# Patient Record
Sex: MALE | Race: WHITE | NOT HISPANIC OR LATINO
[De-identification: names, ages, dates, MRNs, and addresses within clinical notes are randomized per-mention and may not be internally consistent; named-entity substitution may affect disease eponyms.]

---

## 2020-10-27 ENCOUNTER — RESULT CHARGE (OUTPATIENT)
Age: 50
End: 2020-10-27

## 2020-10-27 ENCOUNTER — APPOINTMENT (OUTPATIENT)
Dept: ENDOCRINOLOGY | Facility: CLINIC | Age: 50
End: 2020-10-27
Payer: COMMERCIAL

## 2020-10-27 VITALS
SYSTOLIC BLOOD PRESSURE: 164 MMHG | HEART RATE: 78 BPM | DIASTOLIC BLOOD PRESSURE: 78 MMHG | WEIGHT: 315 LBS | OXYGEN SATURATION: 98 %

## 2020-10-27 DIAGNOSIS — F41.9 ANXIETY DISORDER, UNSPECIFIED: ICD-10-CM

## 2020-10-27 PROCEDURE — 99072 ADDL SUPL MATRL&STAF TM PHE: CPT

## 2020-10-27 PROCEDURE — 99205 OFFICE O/P NEW HI 60 MIN: CPT | Mod: 25

## 2020-10-27 PROCEDURE — 83036 HEMOGLOBIN GLYCOSYLATED A1C: CPT | Mod: QW

## 2020-10-27 PROCEDURE — 82962 GLUCOSE BLOOD TEST: CPT

## 2020-10-28 LAB
CREAT SPEC-SCNC: 99 MG/DL
MICROALBUMIN 24H UR DL<=1MG/L-MCNC: 3.2 MG/DL
MICROALBUMIN/CREAT 24H UR-RTO: 32 MG/G

## 2020-10-28 NOTE — CONSULT LETTER
[Dear  ___] : Dear  [unfilled], [Consult Letter:] : I had the pleasure of evaluating your patient, [unfilled]. [Please see my note below.] : Please see my note below. [Consult Closing:] : Thank you very much for allowing me to participate in the care of this patient.  If you have any questions, please do not hesitate to contact me. [Sincerely,] : Sincerely, [FreeTextEntry3] : Anneliese Evans MS. DO.\par Endocrinology, Diabetes and Metabolism\par [address] [place] [address]\[place] [address]\par Tel [phone]\par Fax [phone]\par

## 2020-10-28 NOTE — ASSESSMENT
[FreeTextEntry1] : 50 year old male with past medical history of Diabetes Mellitus Type 2, HLD, HTN, Obesity, hypogonadism, anxiety  who presents for management of his diabetes\par \par 1. DM 2- uncontrolled, uncomplicated\par Patient counseled on the importance of diabetic control and complications. Patient's diet and the necessary changes discussed. Reviewed over freestyle daniel and use. Reviewed over glycemic goals. Discussed other options for diabetes control. Suggested to try GLP-1. Reviewed mechanism of action, risks and benefits. Patient agrees to try. \par \par Training provided for GLP-1 injection.\par Inc Metformin to 1000 mg BID\par Start Ozempic\par Check fsg BID\par Cont diabetic diet\par Cont exercise\par will check hga1c, microalbumin/cre, lipid, bmp, vit d\par Opthalmology Visit up to date\par Foot Exam up to date\par \par 2. HLD- elevated\par cont atorvastatin\par \par 3. Hypogonadism\par Will send for repeat and workup\par \par 4. Obesity\par Discussed medical management and surgical management\par Will start with medical and try to manage his weight loss for at least 6+ months\par \par Follow up in 6 weeks\par \par

## 2020-10-28 NOTE — HISTORY OF PRESENT ILLNESS
[FreeTextEntry1] : Mr. ISIS COLE  is a 50 year old male with past medical history of Diabetes Mellitus Type 2, HLD, HTN, Obesity, hypogonadism, anxiety  who presents for management of his diabetes. He  has had difficulty in controlling his  blood sugars. Patient denies any history of diabetic retinopathy, nephropathy or neuropathy. He denies any blurry vision and numbness/tingling in the extremities but has been having polyuria\par \par \par POCT Glucose: 275 mg/dL\par POCT Hga1c: %\par \par \par Diabetes first diagnosed: 2019\par Type: 2\par \par Current diabetic regimen:\par Metformin 500 mg BID\par \par Other relevant medications:\par \par Self monitoring blood glucose : Patient does check blood sugars\par \par SMBG:\par Pre-breakfast:\par Pre-lunch:\par Pre-dinner:\par bedtime:\par \par Hypoglycemia:\par \par \par Diet:\par Breakfast: eggs, banana, bagels, english muffin\par Lunch:sandwich, leftoves\par Dinner:pizza, fast food, chinese food, pork chops\par Snacks: fruit, cheese, granola bars\par \par Exercise:\par \par Urine micro:NA\par lipid profile:  (9/2020)\par last hba1c: 9% (10/2020)\par eye exam: few years\par diabetic foot exam/podiatry:NA\par \par \par \par \par

## 2020-12-09 ENCOUNTER — RESULT CHARGE (OUTPATIENT)
Age: 50
End: 2020-12-09

## 2020-12-09 ENCOUNTER — APPOINTMENT (OUTPATIENT)
Dept: ENDOCRINOLOGY | Facility: CLINIC | Age: 50
End: 2020-12-09
Payer: COMMERCIAL

## 2020-12-09 VITALS
WEIGHT: 307 LBS | OXYGEN SATURATION: 97 % | HEIGHT: 68 IN | SYSTOLIC BLOOD PRESSURE: 124 MMHG | BODY MASS INDEX: 46.53 KG/M2 | DIASTOLIC BLOOD PRESSURE: 86 MMHG | HEART RATE: 78 BPM

## 2020-12-09 DIAGNOSIS — E29.1 TESTICULAR HYPOFUNCTION: ICD-10-CM

## 2020-12-09 DIAGNOSIS — E66.01 MORBID (SEVERE) OBESITY DUE TO EXCESS CALORIES: ICD-10-CM

## 2020-12-09 PROCEDURE — 99072 ADDL SUPL MATRL&STAF TM PHE: CPT

## 2020-12-09 PROCEDURE — 95251 CONT GLUC MNTR ANALYSIS I&R: CPT

## 2020-12-09 PROCEDURE — 99214 OFFICE O/P EST MOD 30 MIN: CPT | Mod: 25

## 2020-12-09 PROCEDURE — 82962 GLUCOSE BLOOD TEST: CPT

## 2020-12-09 NOTE — HISTORY OF PRESENT ILLNESS
[FreeTextEntry1] : Mr. ISIS COLE  is a 50 year old male with past medical history of Diabetes Mellitus Type 2, HLD, HTN, Obesity, hypogonadism, anxiety who presents for management of his diabetes.  Patient has lost 10-12 lbs\par \par \par POCT Glucose: 158 mg/dL\par POCT Hga1c: %\par \par \par Diabetes first diagnosed: 2019\par Type: 2\par \par Current diabetic regimen:\par Metformin 1000 mg BID\par Ozempic 0.5 mg Qweekly\par Other relevant medications:\par \par Self monitoring blood glucose : Patient using freestyle daniel\par \par AGP Report: \par (10/11/20-12/9/20)\par High: 5%\par Target (): 93%\par Low: 1%\par \par \par Hypoglycemia:\par \par \par Diet:\par Breakfast: eggs, banana, bagels, english muffin\par Lunch:sandwich, leftoves\par Dinner:pizza, fast food, chinese food, pork chops\par Snacks: fruit, cheese, granola bars\par \par Exercise:\par \par Urine micro: 32 (10/2020)\par lipid profile:  (9/2020)\par last hba1c: 9% (10/2020)\par eye exam: few years\par diabetic foot exam/podiatry:in office (10/2020)\par \par \par \par \par

## 2020-12-09 NOTE — ASSESSMENT
[FreeTextEntry1] : 50 year old male with past medical history of Diabetes Mellitus Type 2, HLD, HTN, Obesity, hypogonadism, anxiety  who presents for management of his diabetes\par \par 1. DM 2- uncontrolled, uncomplicated\par Patient counseled on the importance of diabetic control and complications. Patient's diet and the necessary changes discussed. Reviewed over freestyle daniel and use. Reviewed over glycemic goals. Discussed other options for diabetes control. \par \par Cont Metformin 1000 mg BID\par Cont Ozempic\par Check fsg BID\par Cont diabetic diet\par Cont exercise\par labs UTD\par Opthalmology Visit up to date\par Foot Exam up to date\par \par 2. HLD- elevated\par cont atorvastatin\par \par 3. Hypogonadism\par Will send for repeat and workup\par \par 4. Obesity\par Discussed medical management and surgical management\par Will start with medical and try to manage his weight loss for at least 6+ months\par \par Follow up in 3 months\par \par

## 2020-12-17 ENCOUNTER — NON-APPOINTMENT (OUTPATIENT)
Age: 50
End: 2020-12-17

## 2021-01-22 RX ORDER — FLASH GLUCOSE SENSOR
KIT MISCELLANEOUS
Qty: 6 | Refills: 3 | Status: ACTIVE | COMMUNITY
Start: 2021-01-22 | End: 1900-01-01

## 2021-02-04 ENCOUNTER — APPOINTMENT (OUTPATIENT)
Dept: UROLOGY | Facility: CLINIC | Age: 51
End: 2021-02-04

## 2021-02-24 ENCOUNTER — APPOINTMENT (OUTPATIENT)
Dept: GASTROENTEROLOGY | Facility: CLINIC | Age: 51
End: 2021-02-24
Payer: COMMERCIAL

## 2021-02-24 VITALS
HEIGHT: 68 IN | WEIGHT: 290 LBS | BODY MASS INDEX: 43.95 KG/M2 | SYSTOLIC BLOOD PRESSURE: 157 MMHG | OXYGEN SATURATION: 94 % | HEART RATE: 73 BPM | DIASTOLIC BLOOD PRESSURE: 91 MMHG

## 2021-02-24 DIAGNOSIS — Z12.11 ENCOUNTER FOR SCREENING FOR MALIGNANT NEOPLASM OF COLON: ICD-10-CM

## 2021-02-24 DIAGNOSIS — Z78.9 OTHER SPECIFIED HEALTH STATUS: ICD-10-CM

## 2021-02-24 DIAGNOSIS — K57.92 DIVERTICULITIS OF INTESTINE, PART UNSPECIFIED, W/OUT PERFORATION OR ABSCESS W/OUT BLEEDING: ICD-10-CM

## 2021-02-24 DIAGNOSIS — I10 ESSENTIAL (PRIMARY) HYPERTENSION: ICD-10-CM

## 2021-02-24 PROCEDURE — 99072 ADDL SUPL MATRL&STAF TM PHE: CPT

## 2021-02-24 PROCEDURE — 99202 OFFICE O/P NEW SF 15 MIN: CPT

## 2021-02-24 RX ORDER — SODIUM SULFATE, POTASSIUM SULFATE, MAGNESIUM SULFATE 17.5; 3.13; 1.6 G/ML; G/ML; G/ML
17.5-3.13-1.6 SOLUTION, CONCENTRATE ORAL
Qty: 1 | Refills: 0 | Status: ACTIVE | COMMUNITY
Start: 2021-02-24 | End: 1900-01-01

## 2021-02-24 RX ORDER — TRIAMTERENE AND HYDROCHLOROTHIAZIDE 37.5; 25 MG/1; MG/1
CAPSULE ORAL
Refills: 0 | Status: ACTIVE | COMMUNITY

## 2021-02-24 RX ORDER — ASPIRIN 325 MG/1
325 TABLET, FILM COATED ORAL
Refills: 0 | Status: ACTIVE | COMMUNITY

## 2021-02-24 RX ORDER — ATORVASTATIN CALCIUM 20 MG/1
20 TABLET, FILM COATED ORAL
Refills: 0 | Status: ACTIVE | COMMUNITY

## 2021-02-24 NOTE — ASSESSMENT
[FreeTextEntry1] : Impression\par \par Morbidly obese gentleman with a BMI of 46 to have colonoscopy in a hospital setting\par \par Presurgical testing\par \par Anesthesiologist can decide on anesthesia to be given\par \par Request for colon cancer screening\par \par Suggest\par \par Agree with colonoscopy\par \par Nulytely\par \par The laxative, or its risks benefits and alternatives have been thoroughly reviewed with the patient in great detail. The laxative instructions have been reviewed in great detail with the patient.\par \par Risks/benefits:\par The procedure, the risks and benefits and alternatives have been reviewed in great detail with the patient.  Risks including, but not limited to sedation such as cardiac and pulmonary compromise, the procedure itself such as bleeding requiring hospitalization, transfusion, surgery, temporary or permanent colostomy.  Perforation or puncture of the requiring hospitalization, surgery, temporary colostomy.\par It has been explained to the patient that though colonoscopy is thought to be the best screening exam for colon cancer and polyps, no screening exam can find all colon polyps or cancers.  \par The patient expresses understanding of the procedure and consents to undergoing the procedure.\par

## 2021-02-24 NOTE — REASON FOR VISIT
[Initial Evaluation] : an initial evaluation [Spouse] : spouse [FreeTextEntry1] : Referral for colon cancer screening

## 2021-02-24 NOTE — HISTORY OF PRESENT ILLNESS
[de-identified] : Feb 24, 2021 \par \par MAI MORALES, takes care of this very pleasant 50-year-old gentleman\par \par Morbidly obese\par \par Here to schedule his first colonoscopy\par \par Mr. ISIS COLE 50 year is referred for colon cancer screening.  The patient denies any change in bowel movements, blood per rectum, abdominal, pelvic or rectal discomfort.   \par \par There is no family history of colon cancer or other gastrointestinal cancers.\par \par The patient denies any unexplained weight loss, fever chills or night sweats.\par \par This is the first screening colonoscopy for the patient.\par \par There is no significant cardiac or pulmonary history.\par \par No complaints of chest pain, shortness of breath, palpitations, cough.\par \par The patient is feeling quite well.\par \par The patient is on no significant anticoagulant therapy or anti platelet therapy. \par \par No adverse reaction to anesthesia in the past.\par \par

## 2021-02-24 NOTE — PHYSICAL EXAM
[General Appearance - Alert] : alert [General Appearance - In No Acute Distress] : in no acute distress [FreeTextEntry1] : Very pleasant morbidly obese 50-year-old gentleman with a BMI of 46 [Sclera] : the sclera and conjunctiva were normal [Neck Appearance] : the appearance of the neck was normal [Neck Cervical Mass (___cm)] : no neck mass was observed [Jugular Venous Distention Increased] : there was no jugular-venous distention [Thyroid Diffuse Enlargement] : the thyroid was not enlarged [Thyroid Nodule] : there were no palpable thyroid nodules [Apical Impulse] : the apical impulse was normal [Full Pulse] : the pedal pulses are present [Edema] : there was no peripheral edema [Bowel Sounds] : normal bowel sounds [Abdomen Soft] : soft [Abdomen Tenderness] : non-tender [Abdomen Mass (___ Cm)] : no abdominal mass palpated [] : no hepato-splenomegaly [Cervical Lymph Nodes Enlarged Posterior Bilaterally] : posterior cervical [Cervical Lymph Nodes Enlarged Anterior Bilaterally] : anterior cervical [Supraclavicular Lymph Nodes Enlarged Bilaterally] : supraclavicular [Axillary Lymph Nodes Enlarged Bilaterally] : axillary [Femoral Lymph Nodes Enlarged Bilaterally] : femoral [Inguinal Lymph Nodes Enlarged Bilaterally] : inguinal [No CVA Tenderness] : no ~M costovertebral angle tenderness [No Spinal Tenderness] : no spinal tenderness [Abnormal Walk] : normal gait [Nail Clubbing] : no clubbing  or cyanosis of the fingernails [Musculoskeletal - Swelling] : no joint swelling seen [Motor Tone] : muscle strength and tone were normal [No Focal Deficits] : no focal deficits [Oriented To Time, Place, And Person] : oriented to person, place, and time [Impaired Insight] : insight and judgment were intact [Affect] : the affect was normal

## 2021-02-24 NOTE — CONSULT LETTER
[Dear  ___] : Dear  [unfilled], [Consult Letter:] : I had the pleasure of evaluating your patient, [unfilled]. [Please see my note below.] : Please see my note below. [Consult Closing:] : Thank you very much for allowing me to participate in the care of this patient.  If you have any questions, please do not hesitate to contact me. [Sincerely,] : Sincerely, [FreeTextEntry2] : Elías Berkowitz MD\par  [FreeTextEntry3] : Clemente Greenberg MD\par

## 2021-03-18 ENCOUNTER — APPOINTMENT (OUTPATIENT)
Dept: UROLOGY | Facility: CLINIC | Age: 51
End: 2021-03-18

## 2021-04-15 ENCOUNTER — OUTPATIENT (OUTPATIENT)
Dept: OUTPATIENT SERVICES | Facility: HOSPITAL | Age: 51
LOS: 1 days | End: 2021-04-15
Payer: COMMERCIAL

## 2021-04-15 VITALS
OXYGEN SATURATION: 98 % | TEMPERATURE: 98 F | RESPIRATION RATE: 14 BRPM | WEIGHT: 289.91 LBS | SYSTOLIC BLOOD PRESSURE: 155 MMHG | DIASTOLIC BLOOD PRESSURE: 80 MMHG | HEART RATE: 72 BPM | HEIGHT: 68 IN

## 2021-04-15 DIAGNOSIS — Z12.11 ENCOUNTER FOR SCREENING FOR MALIGNANT NEOPLASM OF COLON: ICD-10-CM

## 2021-04-15 DIAGNOSIS — E11.9 TYPE 2 DIABETES MELLITUS WITHOUT COMPLICATIONS: ICD-10-CM

## 2021-04-15 DIAGNOSIS — Z98.890 OTHER SPECIFIED POSTPROCEDURAL STATES: Chronic | ICD-10-CM

## 2021-04-15 DIAGNOSIS — Z01.818 ENCOUNTER FOR OTHER PREPROCEDURAL EXAMINATION: ICD-10-CM

## 2021-04-15 DIAGNOSIS — K08.409 PARTIAL LOSS OF TEETH, UNSPECIFIED CAUSE, UNSPECIFIED CLASS: Chronic | ICD-10-CM

## 2021-04-15 DIAGNOSIS — Z12.39 ENCOUNTER FOR OTHER SCREENING FOR MALIGNANT NEOPLASM OF BREAST: ICD-10-CM

## 2021-04-15 DIAGNOSIS — Z90.49 ACQUIRED ABSENCE OF OTHER SPECIFIED PARTS OF DIGESTIVE TRACT: Chronic | ICD-10-CM

## 2021-04-15 LAB
A1C WITH ESTIMATED AVERAGE GLUCOSE RESULT: 6.2 % — HIGH (ref 4–5.6)
ALBUMIN SERPL ELPH-MCNC: 4 G/DL — SIGNIFICANT CHANGE UP (ref 3.3–5)
ALP SERPL-CCNC: 79 U/L — SIGNIFICANT CHANGE UP (ref 40–120)
ALT FLD-CCNC: 37 U/L — SIGNIFICANT CHANGE UP (ref 12–78)
ANION GAP SERPL CALC-SCNC: 6 MMOL/L — SIGNIFICANT CHANGE UP (ref 5–17)
AST SERPL-CCNC: 20 U/L — SIGNIFICANT CHANGE UP (ref 15–37)
BILIRUB SERPL-MCNC: 0.4 MG/DL — SIGNIFICANT CHANGE UP (ref 0.2–1.2)
BUN SERPL-MCNC: 25 MG/DL — HIGH (ref 7–23)
CALCIUM SERPL-MCNC: 9.4 MG/DL — SIGNIFICANT CHANGE UP (ref 8.5–10.1)
CHLORIDE SERPL-SCNC: 103 MMOL/L — SIGNIFICANT CHANGE UP (ref 96–108)
CO2 SERPL-SCNC: 29 MMOL/L — SIGNIFICANT CHANGE UP (ref 22–31)
CREAT SERPL-MCNC: 0.96 MG/DL — SIGNIFICANT CHANGE UP (ref 0.5–1.3)
ESTIMATED AVERAGE GLUCOSE: 131 MG/DL — HIGH (ref 68–114)
GLUCOSE SERPL-MCNC: 101 MG/DL — HIGH (ref 70–99)
HCT VFR BLD CALC: 47 % — SIGNIFICANT CHANGE UP (ref 39–50)
HGB BLD-MCNC: 15.3 G/DL — SIGNIFICANT CHANGE UP (ref 13–17)
MCHC RBC-ENTMCNC: 25.8 PG — LOW (ref 27–34)
MCHC RBC-ENTMCNC: 32.6 GM/DL — SIGNIFICANT CHANGE UP (ref 32–36)
MCV RBC AUTO: 79.4 FL — LOW (ref 80–100)
NRBC # BLD: 0 /100 WBCS — SIGNIFICANT CHANGE UP (ref 0–0)
PLATELET # BLD AUTO: 258 K/UL — SIGNIFICANT CHANGE UP (ref 150–400)
POTASSIUM SERPL-MCNC: 4.1 MMOL/L — SIGNIFICANT CHANGE UP (ref 3.5–5.3)
POTASSIUM SERPL-SCNC: 4.1 MMOL/L — SIGNIFICANT CHANGE UP (ref 3.5–5.3)
PROT SERPL-MCNC: 8.4 G/DL — HIGH (ref 6–8.3)
RBC # BLD: 5.92 M/UL — HIGH (ref 4.2–5.8)
RBC # FLD: 13.3 % — SIGNIFICANT CHANGE UP (ref 10.3–14.5)
SODIUM SERPL-SCNC: 138 MMOL/L — SIGNIFICANT CHANGE UP (ref 135–145)
WBC # BLD: 12.11 K/UL — HIGH (ref 3.8–10.5)
WBC # FLD AUTO: 12.11 K/UL — HIGH (ref 3.8–10.5)

## 2021-04-15 PROCEDURE — 93005 ELECTROCARDIOGRAM TRACING: CPT

## 2021-04-15 PROCEDURE — 85027 COMPLETE CBC AUTOMATED: CPT

## 2021-04-15 PROCEDURE — 83036 HEMOGLOBIN GLYCOSYLATED A1C: CPT

## 2021-04-15 PROCEDURE — 36415 COLL VENOUS BLD VENIPUNCTURE: CPT

## 2021-04-15 PROCEDURE — G0463: CPT

## 2021-04-15 PROCEDURE — 93010 ELECTROCARDIOGRAM REPORT: CPT

## 2021-04-15 PROCEDURE — 80053 COMPREHEN METABOLIC PANEL: CPT

## 2021-04-15 NOTE — H&P PST ADULT - NS PRO OT REFERRAL QUES 2 YN
Caller is JEWELS Alvarado. She left voice mail stating she is  for patient and wanted to leave her contact information for pre authorizations and plan of care.    Telephone: [phone]  Fax: [phone]    She is also requesting Dr Elam most recent office note be faxed.    Jenny can be reached at above phone number with any questions.   no

## 2021-04-15 NOTE — H&P PST ADULT - HISTORY OF PRESENT ILLNESS
50 year old male PMH, HTN, hypercholesterolemia, Type 2 DM, Diverticulitis, Anxiety/Depression, Hypogonadism in male, Obesity; Encounter for screening for malignant neoplasm of colon; presents today for PST prior to Colonoscopy with Dr Clemente Greenberg on 4/20/2021. Pt notes this is a routine screening Colonoscopy for him.    Pt has been seen by Anesthesia (Dr Salazar) - as per Dr Salazar patients procedure can take place in ENDO SUITE.

## 2021-04-15 NOTE — H&P PST ADULT - NSICDXPROBLEM_GEN_ALL_CORE_FT
PROBLEM DIAGNOSES  Problem: Encounter for screening for malignant neoplasm of colon  Assessment and Plan: PST Labs; CBC, CMP, HgB A1C, Type & Screen. Cardiac Clearance with Dr Mesa. Pt instructed to stop any NSAIDS/Herbal Supplements between now and procedure. May take Tylenol if needed for pain between now and procedure. Pt has already stopped his Baby ASA. Morning of procedure he may take his Metoprolol with small sip of water. He will HOLD his Triamterene/HCTZ morning of procedure. He was also instructed to HOLD his Metformin day prior to procedure as well as to HOLD Metformin morning of procedure. Also reinforced with patient he will do bowel prep as per Dr Ulloa instructions. Pre-op instructions given to pt with understanding verbalized. Pt has appointment for COVID swab scheduled at [place] site on Saturday 4/17/2021. pt verbalizes understanding of all instructions discussed today in PST. All questions addressed with patient prior to him leaving the PST department. Will check blood glucose morning of procedure.        PROBLEM DIAGNOSES  Problem: Encounter for screening for malignant neoplasm of colon  Assessment and Plan: PST Labs; CBC, CMP, HgB A1C, Type & Screen. Cardiac Clearance with Dr Mesa. Pt instructed to stop any NSAIDS/Herbal Supplements between now and procedure. May take Tylenol if needed for pain between now and procedure. Pt has already stopped his Baby ASA. Morning of procedure he may take his Metoprolol with small sip of water. He will HOLD his Triamterene/HCTZ morning of procedure. He was also instructed to HOLD his Metformin day prior to procedure as well as to HOLD Metformin morning of procedure. Also reinforced with patient he will do bowel prep as per Dr Ulloa instructions. Pre-op instructions given to pt with understanding verbalized. Pt has appointment for COVID swab scheduled at [facility] site on Saturday 4/17/2021. pt verbalizes understanding of all instructions discussed today in PST. All questions addressed with patient prior to him leaving the PST department. Will check blood glucose morning of procedure.     Problem: Type 2 diabetes mellitus  Assessment and Plan: Pt notes he sees Dr Evans for Endo/management of his Diabetes. Pt notes Freestyle Raf continuous blood glucose monitor. Unsure of last A1C. Discussed with pt he might need to see Endo for clearance if A1C comes back > 9. Instructed pt to HOLD Metformin day prior to procedure as well as to HOLD Metformin korning of procedure. We will check blood glucose morning of procedure. Pt verbalizes understanding of all instructions discussed today in PST. (SEE ABOVE FOR FULL PRE-PROCEDURE INSTRUCTIONS).

## 2021-04-15 NOTE — H&P PST ADULT - NSICDXPASTMEDICALHX_GEN_ALL_CORE_FT
PAST MEDICAL HISTORY:  Anxiety     Back pain     Daytime sleepiness     Depression     H/O diverticulitis of colon     Hypercholesterolemia     Hypertension     Hypogonadism in male     Obesity     Type 2 diabetes mellitus

## 2021-04-15 NOTE — H&P PST ADULT - NSICDXFAMILYHX_GEN_ALL_CORE_FT
FAMILY HISTORY:  Father  Still living? No  Family history of heart disease, Age at diagnosis: Age Unknown  Family history of type 2 diabetes mellitus in father, Age at diagnosis: Age Unknown    Mother  Still living? No  Family history of Parkinson's disease, Age at diagnosis: Age Unknown

## 2021-04-15 NOTE — H&P PST ADULT - NSICDXPASTSURGICALHX_GEN_ALL_CORE_FT
PAST SURGICAL HISTORY:  H/O colonoscopy X 3    H/O hernia repair Umbilical Hernia 2011    H/O wisdom tooth extraction     History of colon resection June 1999

## 2021-04-16 ENCOUNTER — NON-APPOINTMENT (OUTPATIENT)
Age: 51
End: 2021-04-16

## 2021-04-16 DIAGNOSIS — Z01.818 ENCOUNTER FOR OTHER PREPROCEDURAL EXAMINATION: ICD-10-CM

## 2021-04-17 ENCOUNTER — APPOINTMENT (OUTPATIENT)
Dept: DISASTER EMERGENCY | Facility: CLINIC | Age: 51
End: 2021-04-17

## 2021-04-18 LAB — SARS-COV-2 N GENE NPH QL NAA+PROBE: NOT DETECTED

## 2021-04-19 ENCOUNTER — TRANSCRIPTION ENCOUNTER (OUTPATIENT)
Age: 51
End: 2021-04-19

## 2021-04-20 ENCOUNTER — APPOINTMENT (OUTPATIENT)
Dept: GASTROENTEROLOGY | Facility: HOSPITAL | Age: 51
End: 2021-04-20
Payer: COMMERCIAL

## 2021-04-20 ENCOUNTER — OUTPATIENT (OUTPATIENT)
Dept: OUTPATIENT SERVICES | Facility: HOSPITAL | Age: 51
LOS: 1 days | End: 2021-04-20
Payer: COMMERCIAL

## 2021-04-20 DIAGNOSIS — K08.409 PARTIAL LOSS OF TEETH, UNSPECIFIED CAUSE, UNSPECIFIED CLASS: Chronic | ICD-10-CM

## 2021-04-20 DIAGNOSIS — Z90.49 ACQUIRED ABSENCE OF OTHER SPECIFIED PARTS OF DIGESTIVE TRACT: Chronic | ICD-10-CM

## 2021-04-20 DIAGNOSIS — Z12.11 ENCOUNTER FOR SCREENING FOR MALIGNANT NEOPLASM OF COLON: ICD-10-CM

## 2021-04-20 DIAGNOSIS — Z98.890 OTHER SPECIFIED POSTPROCEDURAL STATES: Chronic | ICD-10-CM

## 2021-04-20 PROCEDURE — 45378 DIAGNOSTIC COLONOSCOPY: CPT

## 2021-04-20 PROCEDURE — G0121: CPT

## 2021-04-20 PROCEDURE — 82962 GLUCOSE BLOOD TEST: CPT

## 2021-06-24 ENCOUNTER — APPOINTMENT (OUTPATIENT)
Dept: ENDOCRINOLOGY | Facility: CLINIC | Age: 51
End: 2021-06-24

## 2021-07-07 ENCOUNTER — RX RENEWAL (OUTPATIENT)
Age: 51
End: 2021-07-07

## 2021-10-06 PROBLEM — I10 ESSENTIAL HYPERTENSION: Status: ACTIVE | Noted: 2021-02-24

## 2021-12-09 PROBLEM — M54.9 DORSALGIA, UNSPECIFIED: Chronic | Status: ACTIVE | Noted: 2021-04-15

## 2021-12-09 PROBLEM — F32.9 MAJOR DEPRESSIVE DISORDER, SINGLE EPISODE, UNSPECIFIED: Chronic | Status: ACTIVE | Noted: 2021-04-15

## 2021-12-09 PROBLEM — E29.1 TESTICULAR HYPOFUNCTION: Chronic | Status: ACTIVE | Noted: 2021-04-15

## 2021-12-09 PROBLEM — I10 ESSENTIAL (PRIMARY) HYPERTENSION: Chronic | Status: ACTIVE | Noted: 2021-04-15

## 2021-12-09 PROBLEM — E66.9 OBESITY, UNSPECIFIED: Chronic | Status: ACTIVE | Noted: 2021-04-15

## 2021-12-09 PROBLEM — E11.9 TYPE 2 DIABETES MELLITUS WITHOUT COMPLICATIONS: Chronic | Status: ACTIVE | Noted: 2021-04-15

## 2021-12-09 PROBLEM — F41.9 ANXIETY DISORDER, UNSPECIFIED: Chronic | Status: ACTIVE | Noted: 2021-04-15

## 2021-12-09 PROBLEM — R40.0 SOMNOLENCE: Chronic | Status: ACTIVE | Noted: 2021-04-15

## 2021-12-09 PROBLEM — E78.00 PURE HYPERCHOLESTEROLEMIA, UNSPECIFIED: Chronic | Status: ACTIVE | Noted: 2021-04-15

## 2021-12-09 PROBLEM — Z87.19 PERSONAL HISTORY OF OTHER DISEASES OF THE DIGESTIVE SYSTEM: Chronic | Status: ACTIVE | Noted: 2021-04-15

## 2022-02-07 ENCOUNTER — OUTPATIENT (OUTPATIENT)
Dept: OUTPATIENT SERVICES | Facility: HOSPITAL | Age: 52
LOS: 1 days | End: 2022-02-07
Payer: COMMERCIAL

## 2022-02-07 VITALS
HEIGHT: 68 IN | OXYGEN SATURATION: 98 % | WEIGHT: 315 LBS | SYSTOLIC BLOOD PRESSURE: 168 MMHG | DIASTOLIC BLOOD PRESSURE: 96 MMHG | HEART RATE: 76 BPM | RESPIRATION RATE: 16 BRPM | TEMPERATURE: 99 F

## 2022-02-07 DIAGNOSIS — Z98.890 OTHER SPECIFIED POSTPROCEDURAL STATES: Chronic | ICD-10-CM

## 2022-02-07 DIAGNOSIS — Z01.818 ENCOUNTER FOR OTHER PREPROCEDURAL EXAMINATION: ICD-10-CM

## 2022-02-07 DIAGNOSIS — S43.421A SPRAIN OF RIGHT ROTATOR CUFF CAPSULE, INITIAL ENCOUNTER: ICD-10-CM

## 2022-02-07 DIAGNOSIS — K08.409 PARTIAL LOSS OF TEETH, UNSPECIFIED CAUSE, UNSPECIFIED CLASS: Chronic | ICD-10-CM

## 2022-02-07 DIAGNOSIS — Z90.49 ACQUIRED ABSENCE OF OTHER SPECIFIED PARTS OF DIGESTIVE TRACT: Chronic | ICD-10-CM

## 2022-02-07 LAB
ALBUMIN SERPL ELPH-MCNC: 3.5 G/DL — SIGNIFICANT CHANGE UP (ref 3.3–5)
ALP SERPL-CCNC: 81 U/L — SIGNIFICANT CHANGE UP (ref 40–120)
ALT FLD-CCNC: 50 U/L — SIGNIFICANT CHANGE UP (ref 12–78)
ANION GAP SERPL CALC-SCNC: 7 MMOL/L — SIGNIFICANT CHANGE UP (ref 5–17)
AST SERPL-CCNC: 30 U/L — SIGNIFICANT CHANGE UP (ref 15–37)
BILIRUB SERPL-MCNC: 0.4 MG/DL — SIGNIFICANT CHANGE UP (ref 0.2–1.2)
BUN SERPL-MCNC: 17 MG/DL — SIGNIFICANT CHANGE UP (ref 7–23)
CALCIUM SERPL-MCNC: 8.9 MG/DL — SIGNIFICANT CHANGE UP (ref 8.5–10.1)
CHLORIDE SERPL-SCNC: 106 MMOL/L — SIGNIFICANT CHANGE UP (ref 96–108)
CO2 SERPL-SCNC: 27 MMOL/L — SIGNIFICANT CHANGE UP (ref 22–31)
CREAT SERPL-MCNC: 0.9 MG/DL — SIGNIFICANT CHANGE UP (ref 0.5–1.3)
GLUCOSE SERPL-MCNC: 218 MG/DL — HIGH (ref 70–99)
HCT VFR BLD CALC: 44.4 % — SIGNIFICANT CHANGE UP (ref 39–50)
HGB BLD-MCNC: 14.8 G/DL — SIGNIFICANT CHANGE UP (ref 13–17)
MCHC RBC-ENTMCNC: 26.5 PG — LOW (ref 27–34)
MCHC RBC-ENTMCNC: 33.3 GM/DL — SIGNIFICANT CHANGE UP (ref 32–36)
MCV RBC AUTO: 79.6 FL — LOW (ref 80–100)
NRBC # BLD: 0 /100 WBCS — SIGNIFICANT CHANGE UP (ref 0–0)
PLATELET # BLD AUTO: 201 K/UL — SIGNIFICANT CHANGE UP (ref 150–400)
POTASSIUM SERPL-MCNC: 3.8 MMOL/L — SIGNIFICANT CHANGE UP (ref 3.5–5.3)
POTASSIUM SERPL-SCNC: 3.8 MMOL/L — SIGNIFICANT CHANGE UP (ref 3.5–5.3)
PROT SERPL-MCNC: 7.3 G/DL — SIGNIFICANT CHANGE UP (ref 6–8.3)
RBC # BLD: 5.58 M/UL — SIGNIFICANT CHANGE UP (ref 4.2–5.8)
RBC # FLD: 13 % — SIGNIFICANT CHANGE UP (ref 10.3–14.5)
SODIUM SERPL-SCNC: 140 MMOL/L — SIGNIFICANT CHANGE UP (ref 135–145)
WBC # BLD: 10.86 K/UL — HIGH (ref 3.8–10.5)
WBC # FLD AUTO: 10.86 K/UL — HIGH (ref 3.8–10.5)

## 2022-02-07 PROCEDURE — 85027 COMPLETE CBC AUTOMATED: CPT

## 2022-02-07 PROCEDURE — 93010 ELECTROCARDIOGRAM REPORT: CPT

## 2022-02-07 PROCEDURE — 80053 COMPREHEN METABOLIC PANEL: CPT

## 2022-02-07 PROCEDURE — 93005 ELECTROCARDIOGRAM TRACING: CPT

## 2022-02-07 PROCEDURE — 83036 HEMOGLOBIN GLYCOSYLATED A1C: CPT

## 2022-02-07 PROCEDURE — 36415 COLL VENOUS BLD VENIPUNCTURE: CPT

## 2022-02-07 PROCEDURE — G0463: CPT

## 2022-02-07 RX ORDER — DEXTROSE 50 % IN WATER 50 %
15 SYRINGE (ML) INTRAVENOUS ONCE
Refills: 0 | Status: DISCONTINUED | OUTPATIENT
Start: 2022-02-15 | End: 2022-03-01

## 2022-02-07 RX ORDER — DEXTROSE 50 % IN WATER 50 %
25 SYRINGE (ML) INTRAVENOUS ONCE
Refills: 0 | Status: DISCONTINUED | OUTPATIENT
Start: 2022-02-15 | End: 2022-03-01

## 2022-02-07 RX ORDER — GLUCAGON INJECTION, SOLUTION 0.5 MG/.1ML
1 INJECTION, SOLUTION SUBCUTANEOUS ONCE
Refills: 0 | Status: DISCONTINUED | OUTPATIENT
Start: 2022-02-15 | End: 2022-03-01

## 2022-02-07 RX ORDER — DEXTROSE 50 % IN WATER 50 %
12.5 SYRINGE (ML) INTRAVENOUS ONCE
Refills: 0 | Status: DISCONTINUED | OUTPATIENT
Start: 2022-02-15 | End: 2022-03-01

## 2022-02-07 RX ORDER — SEMAGLUTIDE 0.68 MG/ML
0.5 INJECTION, SOLUTION SUBCUTANEOUS
Qty: 0 | Refills: 0 | DISCHARGE

## 2022-02-07 RX ORDER — METOPROLOL TARTRATE 50 MG
1 TABLET ORAL
Qty: 0 | Refills: 0 | DISCHARGE

## 2022-02-07 RX ORDER — METFORMIN HYDROCHLORIDE 850 MG/1
1 TABLET ORAL
Qty: 0 | Refills: 0 | DISCHARGE

## 2022-02-07 NOTE — H&P PST ADULT - NSICDXFAMILYHX_GEN_ALL_CORE_FT
FAMILY HISTORY:  Father  Still living? No  Family history of heart disease, Age at diagnosis: Age Unknown  Family history of type 2 diabetes mellitus in father, Age at diagnosis: Age Unknown    Mother  Still living? No  Family history of Parkinson's disease, Age at diagnosis: Age Unknown  FHx: breast cancer, Age at diagnosis: Age Unknown    Sibling  Still living? Yes, Estimated age: 0-10  FH: depression, Age at diagnosis: Age Unknown

## 2022-02-07 NOTE — H&P PST ADULT - HISTORY OF PRESENT ILLNESS
52 yo male with DM  HLD HTN  scheduled for Right Shoulder Arthroscopy -Subacromial Decompression -Extensive Debridement -Rotator Cuff on 2/15/22 with Dr Wells.  Patient states he fell 1/8/22 and injured right shoulder Current pain is 3/10.  Right Hand Dominant

## 2022-02-07 NOTE — H&P PST ADULT - ASSESSMENT
50 yo male with DM  HLD HTN BMI 48 scheduled for Right Shoulder Arthroscopy -Subacromial Decompression -Extensive Debridement -Rotator Cuff on 2/15/22 with Dr Wells.

## 2022-02-07 NOTE — H&P PST ADULT - PROBLEM SELECTOR PLAN 1
check labs cbc cmp hgb a1c ekg  medical clearance  patient is aware that hgb a1c was done today, if result is 9 or greater he will need to see the Endocrinologist before surgery  patient is aware that he may be monitored for sleep apnea 3-6 hours post op  patient is aware that he will need to have covid test done [id number] hours before surgery has contact information  2/8 stop Ibuprofen and Aspirin  2/14 and 2/15 No Metformin  morning of surgery take Metoprolol with a tiny sip of water  patient verbalizes understanding of instructions

## 2022-02-07 NOTE — H&P PST ADULT - FALL HARM RISK - HARM RISK INTERVENTIONS

## 2022-02-07 NOTE — H&P PST ADULT - NSICDXPASTMEDICALHX_GEN_ALL_CORE_FT
PAST MEDICAL HISTORY:  Anxiety     Back pain     Daytime sleepiness     Depression     H/O diverticulitis of colon     Hypercholesterolemia     Hypertension     Hypogonadism in male     Obesity     Sprain of right rotator cuff capsule, initial encounter     Type 2 diabetes mellitus

## 2022-02-08 LAB
A1C WITH ESTIMATED AVERAGE GLUCOSE RESULT: 9.3 % — HIGH (ref 4–5.6)
ESTIMATED AVERAGE GLUCOSE: 220 MG/DL — HIGH (ref 68–114)

## 2022-02-09 ENCOUNTER — NON-APPOINTMENT (OUTPATIENT)
Age: 52
End: 2022-02-09

## 2022-02-09 ENCOUNTER — APPOINTMENT (OUTPATIENT)
Dept: ENDOCRINOLOGY | Facility: CLINIC | Age: 52
End: 2022-02-09
Payer: COMMERCIAL

## 2022-02-09 VITALS — DIASTOLIC BLOOD PRESSURE: 99 MMHG | HEART RATE: 82 BPM | SYSTOLIC BLOOD PRESSURE: 138 MMHG | OXYGEN SATURATION: 96 %

## 2022-02-09 PROBLEM — S43.421A SPRAIN OF RIGHT ROTATOR CUFF CAPSULE, INITIAL ENCOUNTER: Chronic | Status: ACTIVE | Noted: 2022-02-07

## 2022-02-09 PROCEDURE — 99211 OFF/OP EST MAY X REQ PHY/QHP: CPT

## 2022-02-09 RX ORDER — LANCETS 33 GAUGE
EACH MISCELLANEOUS
Qty: 2 | Refills: 0 | Status: ACTIVE | COMMUNITY
Start: 2022-02-09 | End: 1900-01-01

## 2022-02-09 RX ORDER — BLOOD-GLUCOSE METER
KIT MISCELLANEOUS
Qty: 1 | Refills: 0 | Status: ACTIVE | COMMUNITY
Start: 2022-02-09 | End: 1900-01-01

## 2022-02-09 RX ORDER — BLOOD SUGAR DIAGNOSTIC
STRIP MISCELLANEOUS 4 TIMES DAILY
Qty: 4 | Refills: 0 | Status: ACTIVE | COMMUNITY
Start: 2022-02-09 | End: 1900-01-01

## 2022-02-09 RX ORDER — FLASH GLUCOSE SENSOR
KIT MISCELLANEOUS
Qty: 6 | Refills: 2 | Status: ACTIVE | COMMUNITY
Start: 2020-10-27 | End: 1900-01-01

## 2022-02-13 ENCOUNTER — OUTPATIENT (OUTPATIENT)
Dept: OUTPATIENT SERVICES | Facility: HOSPITAL | Age: 52
LOS: 1 days | End: 2022-02-13
Payer: COMMERCIAL

## 2022-02-13 DIAGNOSIS — Z20.828 CONTACT WITH AND (SUSPECTED) EXPOSURE TO OTHER VIRAL COMMUNICABLE DISEASES: ICD-10-CM

## 2022-02-13 DIAGNOSIS — Z98.890 OTHER SPECIFIED POSTPROCEDURAL STATES: Chronic | ICD-10-CM

## 2022-02-13 DIAGNOSIS — Z90.49 ACQUIRED ABSENCE OF OTHER SPECIFIED PARTS OF DIGESTIVE TRACT: Chronic | ICD-10-CM

## 2022-02-13 DIAGNOSIS — K08.409 PARTIAL LOSS OF TEETH, UNSPECIFIED CAUSE, UNSPECIFIED CLASS: Chronic | ICD-10-CM

## 2022-02-13 LAB — SARS-COV-2 RNA SPEC QL NAA+PROBE: SIGNIFICANT CHANGE UP

## 2022-02-13 PROCEDURE — U0005: CPT

## 2022-02-13 PROCEDURE — U0003: CPT

## 2022-02-14 ENCOUNTER — NON-APPOINTMENT (OUTPATIENT)
Age: 52
End: 2022-02-14

## 2022-02-14 ENCOUNTER — TRANSCRIPTION ENCOUNTER (OUTPATIENT)
Age: 52
End: 2022-02-14

## 2022-02-14 ENCOUNTER — APPOINTMENT (OUTPATIENT)
Dept: ENDOCRINOLOGY | Facility: CLINIC | Age: 52
End: 2022-02-14
Payer: COMMERCIAL

## 2022-02-14 PROCEDURE — 99214 OFFICE O/P EST MOD 30 MIN: CPT | Mod: 25

## 2022-02-14 PROCEDURE — 95251 CONT GLUC MNTR ANALYSIS I&R: CPT

## 2022-02-14 NOTE — HISTORY OF PRESENT ILLNESS
[FreeTextEntry1] : Mr. ISIS COLE is a 51 year old male with past medical history of Diabetes Mellitus Type 2, HLD, HTN, Obesity, hypogonadism, anxiety who presents for management of his diabetes. Patient is following up after over a year. He was seen acutely by RN last week under my watch for insulin training due to urgent surgery of a rotator cuff injury. He admits to poor diet and non-compliance.\par \par POCT Glucose: mg/dL\par POCT Hga1c: %\par \par \par Diabetes first diagnosed: 2019\par Type: 2\par \par Current diabetic regimen:\par Metformin 1000 mg BID\par Lantus 10 units QHS\par Lyumjev 4 units TID with meals\par Other relevant medications:\par \par Self monitoring blood glucose : Patient using freestyle daniel\par \par AGP Report: \par 02/01/2022 to 02/14/2022\par Very high: 1 %\par High:  22 %\par Target range: 77 %\par Low:  0 %\par Very low: 0 %\par \par \par Hypoglycemia:\par \par \par Diet:\par Breakfast: eggs, banana, bagels, english muffin\par Lunch:sandwich, leftoves\par Dinner:pizza, fast food, chinese food, pork chops\par Snacks: fruit, cheese, granola bars\par \par Exercise:\par \par Urine micro: 32 (10/2020)\par lipid profile:  (9/2020)\par last hba1c: 9% (10/2020)\par eye exam: few years\par diabetic foot exam/podiatry:in office (10/2020)\par

## 2022-02-14 NOTE — PHYSICAL EXAM
[Alert] : alert [Well Nourished] : well nourished [Well Developed] : well developed [No Acute Distress] : no acute distress [Normal Sclera/Conjunctiva] : normal sclera/conjunctiva [EOMI] : extra ocular movement intact [No Proptosis] : no proptosis [Normal Oropharynx] : the oropharynx was normal [Thyroid Not Enlarged] : the thyroid was not enlarged [No Thyroid Nodules] : no palpable thyroid nodules [No Respiratory Distress] : no respiratory distress [No Accessory Muscle Use] : no accessory muscle use [Clear to Auscultation] : lungs were clear to auscultation bilaterally [Normal S1, S2] : normal S1 and S2 [Normal Rate] : heart rate was normal [Regular Rhythm] : with a regular rhythm [No Edema] : no peripheral edema [Pedal Pulses Normal] : the pedal pulses are present [Normal Bowel Sounds] : normal bowel sounds [Not Tender] : non-tender [Not Distended] : not distended [Soft] : abdomen soft [Normal Anterior Cervical Nodes] : no anterior cervical lymphadenopathy [Normal Posterior Cervical Nodes] : no posterior cervical lymphadenopathy [No Spinal Tenderness] : no spinal tenderness [Spine Straight] : spine straight [No Stigmata of Cushings Syndrome] : no stigmata of Cushings Syndrome [Normal Gait] : normal gait [Normal Strength/Tone] : muscle strength and tone were normal [No Rash] : no rash [Normal Reflexes] : deep tendon reflexes were 2+ and symmetric [No Tremors] : no tremors [Oriented x3] : oriented to person, place, and time [Acanthosis Nigricans] : no acanthosis nigricans

## 2022-02-14 NOTE — ASSESSMENT
[FreeTextEntry1] : 51 year old male with past medical history of Diabetes Mellitus Type 2, HLD, HTN, Obesity, hypogonadism, anxiety who presents for management of his diabetes\par \par 1. DM 2- uncontrolled, uncomplicated\par Patient counseled on the importance of diabetic control and complications. Patient's diet and the necessary changes discussed. Reviewed over freestyle daniel and use. Reviewed over glycemic goals. Discussed other options for diabetes control. \par \par Cont Metformin 1000 mg BID\par Lantus 10 uints QHS but 8 units before surgery\par Inc Lyumjev to 5 units TID\par Check fsg BID\par Cont diabetic diet\par Cont exercise\par labs UTD\par Opthalmology Visit up to date\par Foot Exam up to date\par \par 2. HLD- elevated\par cont atorvastatin\par \par \par Patient is cleared from my perspective for surgery\par Had a discussion about the importance of glycemic control after surgery including complications such as infection and poor wound healing. Patient understands and agrees to comply and follow up after surgery.

## 2022-02-14 NOTE — ASU PATIENT PROFILE, ADULT - BLOOD AVOIDANCE/RESTRICTIONS, PROFILE
Ongoing SW/CM Assessment/Plan of Care Note     See SW/CM flowsheets for goals and other objective data.    Patient/Family discharge goal (s):  Goal #1: Psychosocial needs assessed  Goal #2: Communication facilitated       PT Recommendation:          OT Recommendation:          SLP Recommendation:       Disposition:  Planned Discharge Destination: Home/apartment    Progress note:   SW following. Writer met with pt, discussed and provided AODA resources. Pt reports having support of her boyfriend who does not drink. Pt indicates plan is to return home with her mother at discharge. SW to remain available to assist with discharge planning as needed.          none

## 2022-02-14 NOTE — HISTORY OF PRESENT ILLNESS
[FreeTextEntry1] : Mr. ISIS COLE  is a 4 year old male with past medical history of Diabetes Mellitus Type 2, HLD, HTN, Obesity, hypogonadism, anxiety who presents for management of his diabetes.  Patient has lost 10-12 lbs   POCT Glucose: 158 mg/dL POCT Hga1c: %   Diabetes first diagnosed: 2019 Type: 2  Current diabetic regimen: Metformin 1000 mg BID Ozempic 0.5 mg Qweekly Other relevant medications:  Self monitoring blood glucose : Patient using freestyle daniel  AGP Report:  (10/11/20-12/9/20) High: 5% Target (): 93% Low: 1%   Hypoglycemia:   Diet: Breakfast: eggs, banana, bagels, english muffin Lunch:sandwich, leftoves Dinner:pizza, fast food, chinese food, pork chops Snacks: fruit, cheese, granola bars  Exercise:  Urine micro: 32 (10/2020) lipid profile:  (9/2020) last hba1c: 9% (10/2020) eye exam: few years diabetic foot exam/podiatry:in office (10/2020)

## 2022-02-14 NOTE — ASU PATIENT PROFILE, ADULT - FALL HARM RISK - HARM RISK INTERVENTIONS

## 2022-02-15 ENCOUNTER — OUTPATIENT (OUTPATIENT)
Dept: OUTPATIENT SERVICES | Facility: HOSPITAL | Age: 52
LOS: 1 days | End: 2022-02-15
Payer: COMMERCIAL

## 2022-02-15 ENCOUNTER — RESULT REVIEW (OUTPATIENT)
Age: 52
End: 2022-02-15

## 2022-02-15 VITALS
DIASTOLIC BLOOD PRESSURE: 85 MMHG | HEIGHT: 68 IN | HEART RATE: 79 BPM | RESPIRATION RATE: 16 BRPM | TEMPERATURE: 99 F | WEIGHT: 309.97 LBS | SYSTOLIC BLOOD PRESSURE: 148 MMHG | OXYGEN SATURATION: 94 %

## 2022-02-15 VITALS
SYSTOLIC BLOOD PRESSURE: 150 MMHG | DIASTOLIC BLOOD PRESSURE: 70 MMHG | OXYGEN SATURATION: 99 % | RESPIRATION RATE: 17 BRPM | HEART RATE: 65 BPM

## 2022-02-15 DIAGNOSIS — Z90.49 ACQUIRED ABSENCE OF OTHER SPECIFIED PARTS OF DIGESTIVE TRACT: Chronic | ICD-10-CM

## 2022-02-15 DIAGNOSIS — Z98.890 OTHER SPECIFIED POSTPROCEDURAL STATES: Chronic | ICD-10-CM

## 2022-02-15 DIAGNOSIS — K08.409 PARTIAL LOSS OF TEETH, UNSPECIFIED CAUSE, UNSPECIFIED CLASS: Chronic | ICD-10-CM

## 2022-02-15 DIAGNOSIS — S43.421A SPRAIN OF RIGHT ROTATOR CUFF CAPSULE, INITIAL ENCOUNTER: ICD-10-CM

## 2022-02-15 PROCEDURE — 29826 SHO ARTHRS SRG DECOMPRESSION: CPT | Mod: RT

## 2022-02-15 PROCEDURE — C9399: CPT

## 2022-02-15 PROCEDURE — 82962 GLUCOSE BLOOD TEST: CPT

## 2022-02-15 PROCEDURE — C1713: CPT

## 2022-02-15 PROCEDURE — 88304 TISSUE EXAM BY PATHOLOGIST: CPT

## 2022-02-15 PROCEDURE — 88304 TISSUE EXAM BY PATHOLOGIST: CPT | Mod: 26

## 2022-02-15 PROCEDURE — 29827 SHO ARTHRS SRG RT8TR CUF RPR: CPT | Mod: RT

## 2022-02-15 DEVICE — IMPLANTABLE DEVICE: Type: IMPLANTABLE DEVICE | Site: RIGHT | Status: FUNCTIONAL

## 2022-02-15 RX ORDER — SODIUM CHLORIDE 9 MG/ML
1000 INJECTION, SOLUTION INTRAVENOUS
Refills: 0 | Status: DISCONTINUED | OUTPATIENT
Start: 2022-02-15 | End: 2022-02-15

## 2022-02-15 RX ORDER — METOCLOPRAMIDE HCL 10 MG
10 TABLET ORAL ONCE
Refills: 0 | Status: DISCONTINUED | OUTPATIENT
Start: 2022-02-15 | End: 2022-02-15

## 2022-02-15 RX ORDER — HYDROMORPHONE HYDROCHLORIDE 2 MG/ML
0.5 INJECTION INTRAMUSCULAR; INTRAVENOUS; SUBCUTANEOUS
Refills: 0 | Status: DISCONTINUED | OUTPATIENT
Start: 2022-02-15 | End: 2022-02-15

## 2022-02-15 RX ORDER — KETOROLAC TROMETHAMINE 30 MG/ML
30 SYRINGE (ML) INJECTION ONCE
Refills: 0 | Status: DISCONTINUED | OUTPATIENT
Start: 2022-02-15 | End: 2022-02-15

## 2022-02-15 RX ORDER — ONDANSETRON 8 MG/1
4 TABLET, FILM COATED ORAL ONCE
Refills: 0 | Status: DISCONTINUED | OUTPATIENT
Start: 2022-02-15 | End: 2022-02-15

## 2022-02-15 RX ORDER — HYDROMORPHONE HYDROCHLORIDE 2 MG/ML
1 INJECTION INTRAMUSCULAR; INTRAVENOUS; SUBCUTANEOUS
Refills: 0 | Status: DISCONTINUED | OUTPATIENT
Start: 2022-02-15 | End: 2022-02-15

## 2022-02-15 RX ORDER — IBUPROFEN 200 MG
2 TABLET ORAL
Qty: 0 | Refills: 0 | DISCHARGE

## 2022-02-15 RX ORDER — CEFAZOLIN SODIUM 1 G
2000 VIAL (EA) INJECTION ONCE
Refills: 0 | Status: COMPLETED | OUTPATIENT
Start: 2022-02-15 | End: 2022-02-15

## 2022-02-15 RX ADMIN — HYDROMORPHONE HYDROCHLORIDE 1 MILLIGRAM(S): 2 INJECTION INTRAMUSCULAR; INTRAVENOUS; SUBCUTANEOUS at 15:03

## 2022-02-15 RX ADMIN — SODIUM CHLORIDE 75 MILLILITER(S): 9 INJECTION, SOLUTION INTRAVENOUS at 14:36

## 2022-02-15 RX ADMIN — HYDROMORPHONE HYDROCHLORIDE 1 MILLIGRAM(S): 2 INJECTION INTRAMUSCULAR; INTRAVENOUS; SUBCUTANEOUS at 14:48

## 2022-02-15 RX ADMIN — Medication 30 MILLIGRAM(S): at 14:27

## 2022-02-15 RX ADMIN — Medication 30 MILLIGRAM(S): at 14:42

## 2022-02-15 NOTE — ASU DISCHARGE PLAN (ADULT/PEDIATRIC) - CARE PROVIDER_API CALL
Wes Wells (MD)  Orthopaedic Surgery  [address] [place] [address]  Phone: [phone]  Fax: [phone]  Follow Up Time:

## 2022-02-15 NOTE — PRE-OP CHECKLIST - AS BP NONINV SITE
Care Suites Admission Nursing Note    Patient Information  Name: Antonio Ramirez  Age: 75 year old  Reason for admission: paracentesis  Care Suites arrival time: 1200    Patient Admission/Assessment   Pre-procedure assessment complete: Yes  If abnormal assessment/labs, provider notified: N/A  NPO: N/A  Medications held per instructions/orders: Yes  Consent: deferred  If applicable, pregnancy test status: declined  Patient oriented to room: Yes  Education/questions answered: Yes  Plan/other: proceed after reviewing lab    Discharge Planning  Accompanied by:   Discharge name/phone number:[place] - spouse  [phone]  Discharge location: home    Davie Merritt RN           
Care Suites Discharge Nursing Note    Patient Information  Name: Antonio Ramirez  Age: 75 year old    Discharge Education:  Discharge instructions reviewed: Yes  Additional education/resources provided: No  Patient/patient representative verbalizes understanding: Yes  Patient discharging on new medications: No  Medication education completed: Yes    Discharge Plans:   Discharge location: home  Discharge ride contacted: Yes  Approximate discharge time: 15:18    Discharge Criteria:  Discharge criteria met and vital signs stable: Yes    Patient Belongs:  Patient belongings returned to patient: Yes            
Care Suites Procedure Nursing Note    Patient Information  Name: Antonio Ramirez  Age: 75 year old    Procedure  Procedure: Paracentesis  Procedure start time: 14:30  Procedure complete time: 14:45  Concerns/abnormal assessment: None  2,400 mL cloudy rust color fluid removed from abdomen  LLQ site- bandaid CDI  Plan/Other: Return to Care Suites.  Discharge to home when patient meets criteria          
PATIENT WELLNESS SCREENING    Step 1: Answer all screening questions 1-3.    1. In the last month, have you been in contact with someone who was confirmed or suspected to have Coronavirus/COVID-19? No    2. Do you have the following symptoms?   Fever? No   Cough? No   Shortness of breath? No   Skin rash? No    3. Have you traveled internationally in the last month? No   If so, where? na    Step 2: Refer to logic grid below for actions    NO SYMPTOM(S)    ACTIONS:  1. Standard rooming process  2. Provider to assess per normal protocol    POSITIVE SYMPTOM(S)  If positive for ANY of the following symptoms: fever, cough, shortness of breath, rash    ACTIONS  1. Mask patient  2. Room patient as soon as possible  3. Don appropriate PPE when entering room  4. Provider evaluation    
left upper arm

## 2022-02-15 NOTE — ASU DISCHARGE PLAN (ADULT/PEDIATRIC) - ASU DC SPECIAL INSTRUCTIONSFT
Shoulder Arthroscopy Instructions    1) PAIN & SWELLING: Your shoulder may swell over the next 48-72 hours and pain may get a bit worse. Ice it plenty, continuously if possible. Fill up a plastic bag, then wrap it in a towel or pillow case and lay it on your shoulder.     2) ACTIVITY: Elevate your hand as much you can in a "pledge of allegiance" fashion, and wiggle your fingers as often. Otherwise you should be up and about, walking as much as you can tolerate. The more you move the better you will do. No weight bearing on the arm yet.     3) BANDAGE: Expect some drainage onto the bandage. It is normal. It may even soak through and look bloody. This is mostly leftover Saline fluid coming out from surgery. Even a drop of blood can make it look very bloody. Just place another Gauze over it. If it bleeds through the second bandage again, call.    4) SHOWER: Remove bandage in 72hrs and place waterproof band aides. You can shower in 72 hours. No bath, no pool, no hot tub. Pat your incisions dry. No creams, no lotions.    5) ISSUES: Only reason to worry would be if pain got so severe that you cannot feel or wiggle your fingers. In this case you need to call or come to the ER. But as long as you can feel or wiggle your fingers, you are fine.    6) FOLLOW UP: Call Dr. Wells's office to schedule a follow up appointment to be seen in 10-14 days. Your sutures (if applicable) will be removed at that point.    7) PAIN MEDICATION: A pain Rx was sent electronically to your pharmacy, pick it up on the way home.    8) HOME MEDICATIONS: resume your home medications as you normally do.

## 2022-02-17 LAB — SURGICAL PATHOLOGY STUDY: SIGNIFICANT CHANGE UP

## 2022-03-08 RX ORDER — FLASH GLUCOSE SCANNING READER
EACH MISCELLANEOUS
Qty: 1 | Refills: 0 | Status: ACTIVE | COMMUNITY
Start: 2022-03-08 | End: 1900-01-01

## 2022-03-08 RX ORDER — FLASH GLUCOSE SCANNING READER
EACH MISCELLANEOUS
Qty: 1 | Refills: 0 | Status: ACTIVE | COMMUNITY
Start: 2020-10-27 | End: 1900-01-01

## 2022-03-16 ENCOUNTER — APPOINTMENT (OUTPATIENT)
Dept: ENDOCRINOLOGY | Facility: CLINIC | Age: 52
End: 2022-03-16
Payer: COMMERCIAL

## 2022-03-16 VITALS
WEIGHT: 315 LBS | DIASTOLIC BLOOD PRESSURE: 85 MMHG | SYSTOLIC BLOOD PRESSURE: 163 MMHG | HEART RATE: 73 BPM | HEIGHT: 68 IN | OXYGEN SATURATION: 97 % | BODY MASS INDEX: 47.74 KG/M2

## 2022-03-16 PROCEDURE — 99214 OFFICE O/P EST MOD 30 MIN: CPT | Mod: 25

## 2022-03-16 PROCEDURE — 95251 CONT GLUC MNTR ANALYSIS I&R: CPT

## 2022-03-18 NOTE — ASSESSMENT
[FreeTextEntry1] : 51 year old male with past medical history of Diabetes Mellitus Type 2, HLD, HTN, Obesity, hypogonadism, anxiety who presents for management of his diabetes\par \par 1. DM 2- uncontrolled, uncomplicated\par Patient counseled on the importance of diabetic control and complications. Patient's diet and the necessary changes discussed. Reviewed over freestyle daniel and use. Reviewed over glycemic goals. Discussed other options for diabetes control. \par \par Cont Metformin 1000 mg BID\par Lantus 10 uints QHS\par Stop Lyumjev\par Start Ozempic\par Check fsg BID\par Cont diabetic diet\par Cont exercise\par labs UTD\par Opthalmology Visit up to date\par Foot Exam up to date\par \par 2. HLD- elevated\par cont atorvastatin\par \par Follow up in 3 months

## 2022-03-18 NOTE — HISTORY OF PRESENT ILLNESS
[FreeTextEntry1] : Mr. ISIS COLE is a 51 year old male with past medical history of Diabetes Mellitus Type 2, HLD, HTN, Obesity, hypogonadism, anxiety who presents for management of his diabetes.\par \par POCT Glucose: mg/dL\par POCT Hga1c: %\par \par \par Diabetes first diagnosed: 2019\par Type: 2\par \par Current diabetic regimen:\par Metformin 1000 mg BID\par Lantus 10 units QHS\par Lyumjev 5 units TID with meals\par Other relevant medications:\par \par Self monitoring blood glucose : Patient using freestyle daniel\par \par AGP Report: \par 02/8/2022 to 03/7/2022\par Very high: 19 %\par High:  19 %\par Target range: 80 %\par Low:  0 %\par Very low: 0 %\par \par Hypoglycemia:none\par \par Diet:\par Breakfast: eggs, banana, bagels, english muffin\par Lunch:sandwich, leftoves\par Dinner:pizza, fast food, chinese food, pork chops\par Snacks: fruit, cheese, granola bars\par \par Exercise:\par \par Urine micro: 32 (10/2020)\par lipid profile:  (9/2020)\par last hba1c: 9% (10/2020)\par eye exam: 1/2022\par diabetic foot exam/podiatry:in office (10/2020)\par

## 2022-09-19 ENCOUNTER — APPOINTMENT (OUTPATIENT)
Dept: ENDOCRINOLOGY | Facility: CLINIC | Age: 52
End: 2022-09-19

## 2022-09-19 VITALS
DIASTOLIC BLOOD PRESSURE: 89 MMHG | HEART RATE: 80 BPM | OXYGEN SATURATION: 99 % | BODY MASS INDEX: 47.74 KG/M2 | HEIGHT: 68 IN | SYSTOLIC BLOOD PRESSURE: 134 MMHG | WEIGHT: 315 LBS

## 2022-09-19 PROCEDURE — 99214 OFFICE O/P EST MOD 30 MIN: CPT | Mod: 25

## 2022-09-19 PROCEDURE — 82962 GLUCOSE BLOOD TEST: CPT

## 2022-09-19 RX ORDER — INSULIN LISPRO-AABC 100 [IU]/ML
100 INJECTION, SOLUTION SUBCUTANEOUS
Qty: 2 | Refills: 0 | Status: DISCONTINUED | COMMUNITY
Start: 2022-02-09 | End: 2022-09-19

## 2022-09-19 RX ORDER — PEN NEEDLE, DIABETIC 29 G X1/2"
32G X 4 MM NEEDLE, DISPOSABLE MISCELLANEOUS
Qty: 3 | Refills: 0 | Status: ACTIVE | COMMUNITY
Start: 2022-02-10 | End: 1900-01-01

## 2022-09-20 RX ORDER — SEMAGLUTIDE 1.34 MG/ML
2 INJECTION, SOLUTION SUBCUTANEOUS
Qty: 3 | Refills: 2 | Status: DISCONTINUED | COMMUNITY
Start: 2020-10-27 | End: 2022-09-20

## 2022-09-21 NOTE — ASSESSMENT
[FreeTextEntry1] : 52 year old male with past medical history of Diabetes Mellitus Type 2, HLD, HTN, Obesity, hypogonadism, anxiety who presents for management of his diabetes\par \par 1. DM 2- uncontrolled, uncomplicated\par Patient counseled on the importance of diabetic control and complications. Patient's diet and the necessary changes discussed. Reviewed over freestyle daniel and use. Reviewed over glycemic goals. Discussed other options for diabetes control. BLood sugars are high.\par \par Cont Metformin 1000 mg BID\par Lantus 10 uints QHS\par Start Mounjaro\par Check fsg BID\par Cont diabetic diet\par Cont exercise\par labs UTD\par Opthalmology Visit up to date\par Foot Exam up to date\par \par 2. HLD- elevated\par cont atorvastatin\par \par Follow up in 3 months

## 2022-09-21 NOTE — HISTORY OF PRESENT ILLNESS
[FreeTextEntry1] : Mr. ISIS COLE is a 52 year old male with past medical history of Diabetes Mellitus Type 2, HLD, HTN, Obesity, hypogonadism, anxiety who presents for management of his diabetes.\par \par POCT Glucose:227 mg/dL\par POCT Hga1c: %\par \par \par Diabetes first diagnosed: 2019\par Type: 2\par \par Current diabetic regimen:\par Metformin 1000 mg BID\par Lantus 10 units QHS\par Lyumjev 5 units TID with meals (stopped)\par Ozempic (stopped due to diarrhea)\par Other relevant medications:\par \par Self monitoring blood glucose : Not monitoring\par \par Hypoglycemia:none\par \par Diet:\par Breakfast: eggs, banana, bagels, english muffin\par Lunch:sandwich, leftoves\par Dinner:pizza, fast food, chinese food, pork chops\par Snacks: fruit, cheese, granola bars\par \par Exercise:\par \par Urine micro: 32 (10/2020)\par lipid profile:  (9/2020)\par last hba1c: 9% (10/2020)\par eye exam: 1/2022\par diabetic foot exam/podiatry:in office (10/2020)\par

## 2022-10-18 ENCOUNTER — RX RENEWAL (OUTPATIENT)
Age: 52
End: 2022-10-18

## 2023-01-18 ENCOUNTER — APPOINTMENT (OUTPATIENT)
Dept: ENDOCRINOLOGY | Facility: CLINIC | Age: 53
End: 2023-01-18
Payer: COMMERCIAL

## 2023-01-18 VITALS
BODY MASS INDEX: 43.35 KG/M2 | DIASTOLIC BLOOD PRESSURE: 79 MMHG | HEIGHT: 68 IN | SYSTOLIC BLOOD PRESSURE: 127 MMHG | WEIGHT: 286 LBS | OXYGEN SATURATION: 97 % | HEART RATE: 76 BPM

## 2023-01-18 PROCEDURE — 99214 OFFICE O/P EST MOD 30 MIN: CPT | Mod: 25

## 2023-01-18 PROCEDURE — 82962 GLUCOSE BLOOD TEST: CPT

## 2023-01-18 RX ORDER — TIRZEPATIDE 5 MG/.5ML
5 INJECTION, SOLUTION SUBCUTANEOUS
Qty: 3 | Refills: 3 | Status: DISCONTINUED | COMMUNITY
Start: 2022-10-18 | End: 2023-01-18

## 2023-01-18 RX ORDER — INSULIN DEGLUDEC INJECTION 100 U/ML
100 INJECTION, SOLUTION SUBCUTANEOUS
Qty: 2 | Refills: 1 | Status: DISCONTINUED | COMMUNITY
Start: 2022-02-09 | End: 2023-01-18

## 2023-01-19 NOTE — HISTORY OF PRESENT ILLNESS
[FreeTextEntry1] : Mr. ISIS COLE is a 52 year old male with past medical history of Diabetes Mellitus Type 2, HLD, HTN, Obesity, hypogonadism, anxiety who presents for management of his diabetes.\par \par POCT Glucose:117 mg/dL\par POCT Hga1c: %\par \par \par Diabetes first diagnosed: 2019\par Type: 2\par \par Current diabetic regimen:\par Metformin 1000 mg BID\par Tresiba 10 units QHS\par Mounjaro 7.5 mg Qweekly\par Lyumjev 5 units TID with meals (stopped)\par Ozempic (stopped due to diarrhea)\par Other relevant medications:\par \par Self monitoring blood glucose : Not monitoring\par \par Hypoglycemia:none\par \par Diet:\par Breakfast: eggs, banana, bagels, english muffin\par Lunch:sandwich, leftoves\par Dinner:pizza, fast food, chinese food, pork chops\par Snacks: fruit, cheese, granola bars\par \par Exercise:\par \par Urine micro: 32 (10/2020)\par lipid profile:  (9/2020)\par last hba1c: 6.6% (1/2023), 9% (10/2020)\par eye exam: 1/2022\par diabetic foot exam/podiatry:in office (10/2020)\par

## 2023-01-19 NOTE — CONSULT LETTER
[Dear  ___] : Dear  [unfilled], [Courtesy Letter:] : I had the pleasure of seeing your patient, [unfilled], in my office today. [Please see my note below.] : Please see my note below. [Consult Closing:] : Thank you very much for allowing me to participate in the care of this patient.  If you have any questions, please do not hesitate to contact me. [Sincerely,] : Sincerely, [FreeTextEntry3] : Anneliese Evans MS. DO.\par Endocrinology, Diabetes and Metabolism\par [address] [place] [address]\[place] [address]\par Tel [phone]\par Fax [phone]\par

## 2023-01-19 NOTE — ASSESSMENT
[FreeTextEntry1] : 52 year old male with past medical history of Diabetes Mellitus Type 2, HLD, HTN, Obesity, hypogonadism, anxiety who presents for management of his diabetes\par \par 1. DM 2- controlled, uncomplicated\par Patient counseled on the importance of diabetic control and complications. Patient's diet and the necessary changes discussed. Reviewed over freestyle daniel and use. Reviewed over glycemic goals. \par \par Cont Metformin 1000 mg BID\par Stop Tresiba\par Cont Mounjaro 7.5 mg qweekly\par Check fsg BID\par Cont diabetic diet\par Cont exercise\par labs UTD\par Opthalmology Visit up to date\par Foot Exam up to date\par Patient to do labs with pcp\par \par 2. HLD- elevated\par cont atorvastatin\par \par Follow up in 3 months

## 2023-02-15 NOTE — PRE-OP CHECKLIST - INTERNAL PROSTHESES
Patient left a voice message today at 1245 needing to reschedule his scope for tomorrow. He said he was not aware he needed to be on a clear diet today.    no

## 2023-04-26 ENCOUNTER — APPOINTMENT (OUTPATIENT)
Dept: ENDOCRINOLOGY | Facility: CLINIC | Age: 53
End: 2023-04-26
Payer: COMMERCIAL

## 2023-04-26 VITALS
RESPIRATION RATE: 16 BRPM | HEIGHT: 68 IN | DIASTOLIC BLOOD PRESSURE: 80 MMHG | HEART RATE: 70 BPM | OXYGEN SATURATION: 98 % | SYSTOLIC BLOOD PRESSURE: 130 MMHG | WEIGHT: 281 LBS | BODY MASS INDEX: 42.59 KG/M2

## 2023-04-26 PROCEDURE — 99214 OFFICE O/P EST MOD 30 MIN: CPT

## 2023-04-26 RX ORDER — CHOLECALCIFEROL (VITAMIN D3) 50 MCG
25 MCG TABLET ORAL
Refills: 0 | Status: ACTIVE | COMMUNITY
Start: 2023-04-26

## 2023-04-26 RX ORDER — ALLOPURINOL 300 MG/1
300 TABLET ORAL DAILY
Refills: 0 | Status: ACTIVE | COMMUNITY
Start: 2023-04-26

## 2023-04-26 RX ORDER — COLCHICINE 0.6 MG/1
0.6 TABLET ORAL DAILY
Refills: 0 | Status: ACTIVE | COMMUNITY
Start: 2023-04-26

## 2023-04-26 RX ORDER — OLMESARTAN MEDOXOMIL 20 MG/1
20 TABLET, FILM COATED ORAL DAILY
Refills: 0 | Status: ACTIVE | COMMUNITY
Start: 2023-04-26

## 2023-04-26 NOTE — HISTORY OF PRESENT ILLNESS
[FreeTextEntry1] : Mr. ISIS COLE is a 52 year old male with past medical history of Diabetes Mellitus Type 2, HLD, HTN, Obesity, hypogonadism, anxiety who presents for management of his diabetes.\par \par POCT Glucose: mg/dL\par POCT Hga1c: %\par \par \par Diabetes first diagnosed: 2019\par Type: 2\par \par Current diabetic regimen:\par Metformin 1000 mg BID\par Mounjaro 7.5 mg Qweekly\par \par Tresiba 10 units QHS (stopped)\par Lyumjev 5 units TID with meals (stopped)\par Ozempic (stopped due to diarrhea)\par Other relevant medications:\par \par Self monitoring blood glucose : Not monitoring\par \par Hypoglycemia:none\par \par Diet:\par Breakfast: eggs, banana, bagels, english muffin\par Lunch:sandwich, leftoves\par Dinner:pizza, fast food, chinese food, pork chops\par Snacks: fruit, cheese, granola bars\par \par Exercise:\par \par Urine micro: wnl (2/2023), 32 (10/2020)\par lipid profile:LDL 71 (2/2023),   (9/2020)\par last hba1c: 6.1% (2/2023), 6.6% (1/2023), 9% (10/2020)\par eye exam: 1/2022\par diabetic foot exam/podiatry:in office (10/2020)\par

## 2023-04-26 NOTE — ASSESSMENT
[FreeTextEntry1] : 52 year old male with past medical history of Diabetes Mellitus Type 2, HLD, HTN, Obesity, hypogonadism, anxiety who presents for management of his diabetes\par \par 1. DM 2- controlled, uncomplicated\par Patient counseled on the importance of diabetic control and complications. Patient's diet and the necessary changes discussed. Reviewed over freestyle daniel and use. Reviewed over glycemic goals. \par \par Cont Metformin 1000 mg BID\par Cont Mounjaro 7.5 mg qweekly\par Check fsg BID\par Cont diabetic diet\par Cont exercise\par labs UTD\par Opthalmology Visit up to date\par Foot Exam up to date\par Patient to do labs with pcp\par \par 2. HLD- elevated\par cont atorvastatin\par \par Follow up in 6 months

## 2023-06-22 ENCOUNTER — RX RENEWAL (OUTPATIENT)
Age: 53
End: 2023-06-22

## 2023-11-02 ENCOUNTER — APPOINTMENT (OUTPATIENT)
Dept: ENDOCRINOLOGY | Facility: CLINIC | Age: 53
End: 2023-11-02
Payer: COMMERCIAL

## 2023-11-02 VITALS
WEIGHT: 252 LBS | HEIGHT: 68 IN | DIASTOLIC BLOOD PRESSURE: 80 MMHG | HEART RATE: 76 BPM | SYSTOLIC BLOOD PRESSURE: 124 MMHG | OXYGEN SATURATION: 97 % | BODY MASS INDEX: 38.19 KG/M2

## 2023-11-02 PROCEDURE — 99214 OFFICE O/P EST MOD 30 MIN: CPT

## 2023-11-15 ENCOUNTER — APPOINTMENT (OUTPATIENT)
Dept: ENDOCRINOLOGY | Facility: CLINIC | Age: 53
End: 2023-11-15

## 2024-01-12 ENCOUNTER — RX RENEWAL (OUTPATIENT)
Age: 54
End: 2024-01-12

## 2024-01-12 RX ORDER — METFORMIN HYDROCHLORIDE 1000 MG/1
1000 TABLET, COATED ORAL
Qty: 180 | Refills: 1 | Status: ACTIVE | COMMUNITY
Start: 2020-10-27 | End: 1900-01-01

## 2024-02-12 NOTE — H&P PST ADULT - FUNCTIONAL ASSESSMENT - BASIC MOBILITY 5.
Brief Postoperative Note      Patient: Aleida Magdaleno  YOB: 1972  MRN: [id number]    Date of Procedure: 2/12/2024    Pre-Op Diagnosis Codes:     * Osteoarthritis of right knee, unspecified osteoarthritis type [M17.11]   With instability, recurvatum, osgoode schlatters, acquired deformity distal femur,bmi37.3    Post-Op Diagnosis: Same       Procedure(s):  RIGHT TOTAL KNEE REPLACEMENT- difficult    Surgeon(s):  Ramesh De Paz MD    Assistant:  Surgical Assistant: Chano Fontanez  Physician Assistant: Chad Sanderson PA-C    Anesthesia: General    Estimated Blood Loss (mL): less than 50     Complications: None    Specimens:   * No specimens in log *    Implants:  Implant Name Type Inv. Item Serial No.  Lot No. LRB No. Used Action   PIN FIX L3IN DIA1/8IN S STL TRCR CNTR PNT COMPASS - [id number]  PIN FIX L3IN DIA1/8IN S STL [facility]  [employer]- [id number] Right 4 Implanted   CEMENT BNE 20ML 41GM FULL DOSE PMMA W/ TOBRA M VISC RADPQ - [id number]  CEMENT BNE 20ML 41GM FULL DOSE PMMA W/ TOBRA M VISC RADPQ  [employer] [facility] [id number] Right 2 Implanted   BASEPLATE TIB SZ 6 AP54MM ML77MM THK2.3MM R KNEE TI ALLY NP - [id number]  BASEPLATE TIB SZ 6 AP54MM ML77MM THK2.3MM R KNEE TI ALLY NP  [employer]- [id number] Right 1 Implanted   COMPONENT FEM SZ 8 R KNEE OXINIUM POST STBL NP RENAN STEMLESS - [id number]  COMPONENT FEM SZ 8 R KNEE OXINIUM POST STBL NP RENAN STEMLESS  [employer]- [id number] Right 1 Implanted   IMPLANT PAT [id number] X3 ASYM TRIATHLON - [id number]  IMPLANT PAT [id number] X3 ASYM TRIATHLON  [employer] [facility] [id number] Right 1 Implanted   INSERT TIB SZ 5-6 [id number] POST KNEE POLYETH POST STBL HI - [id number]  INSERT TIB SZ 5-6 [id number] POST KNEE POLYETH POST STBL HI  [employer]-WD [id number] Right 1 Implanted         Drains: * No LDAs found *    Findings:  4 = No assist / stand by assistance

## 2024-03-07 ENCOUNTER — APPOINTMENT (OUTPATIENT)
Dept: ENDOCRINOLOGY | Facility: CLINIC | Age: 54
End: 2024-03-07
Payer: COMMERCIAL

## 2024-03-07 VITALS
SYSTOLIC BLOOD PRESSURE: 103 MMHG | BODY MASS INDEX: 38.34 KG/M2 | OXYGEN SATURATION: 98 % | RESPIRATION RATE: 18 BRPM | WEIGHT: 253 LBS | DIASTOLIC BLOOD PRESSURE: 66 MMHG | HEART RATE: 62 BPM | HEIGHT: 68 IN

## 2024-03-07 DIAGNOSIS — E78.00 PURE HYPERCHOLESTEROLEMIA, UNSPECIFIED: ICD-10-CM

## 2024-03-07 DIAGNOSIS — E11.65 TYPE 2 DIABETES MELLITUS WITH HYPERGLYCEMIA: ICD-10-CM

## 2024-03-07 PROCEDURE — 99214 OFFICE O/P EST MOD 30 MIN: CPT

## 2024-03-07 RX ORDER — TIRZEPATIDE 10 MG/.5ML
10 INJECTION, SOLUTION SUBCUTANEOUS
Qty: 12 | Refills: 2 | Status: ACTIVE | COMMUNITY
Start: 2022-09-19 | End: 1900-01-01

## 2024-03-07 NOTE — ASSESSMENT
[FreeTextEntry1] : 53 year old male with past medical history of Diabetes Mellitus Type 2, HLD, HTN, Obesity, hypogonadism, anxiety who presents for management of his diabetes  1. DM 2- controlled, uncomplicated Patient counseled on the importance of diabetic control and complications. Patient's diet and the necessary changes discussed. Reviewed over freestyle daniel and use. Reviewed over glycemic goals.  Stop metformin 500 mg BID Increase to Mounjaro 10 mg qweekly Check fsg BID Cont diabetic diet Cont exercise labs ordered Opthalmology Visit up to date Foot Exam up to date  2. HLD-  cont atorvastatin

## 2024-03-07 NOTE — HISTORY OF PRESENT ILLNESS
[FreeTextEntry1] : Mr. ISIS COLE is a 53 year old male with past medical history of Diabetes Mellitus Type 2, HLD, HTN, Obesity, hypogonadism, anxiety who presents for management of his diabetes.  Patient feels well and denies any complaints.  POCT Glucose: mg/dL POCT Hga1c: %   Diabetes first diagnosed: 2019 Type: 2  Current diabetic regimen: Metformin 500 mg BID Mounjaro 7.5 mg Qweekly  Tresiba 10 units QHS (stopped) Lyumjev 5 units TID with meals (stopped) Ozempic (stopped due to diarrhea) Other relevant medications:  Self monitoring blood glucose : Not monitoring  Hypoglycemia:none  Diet: Breakfast: eggs, banana, bagels, english muffin Lunch:sandwich, leftoves Dinner:pizza, fast food, chinese food, pork chops Snacks: fruit, cheese, granola bars  Exercise:  Urine micro: wnl (2/2023), 32 (10/2020) lipid profile:LDL 71 (2/2023),   (9/2020) last hba1c: 5.3% (9/2023), 6.1% (2/2023), 6.6% (1/2023), 9% (10/2020) eye exam: 1/2024 diabetic foot exam/podiatry:in office (11/2023)

## 2024-03-18 LAB
ALBUMIN SERPL ELPH-MCNC: 4.6 G/DL
ALP BLD-CCNC: 88 U/L
ALT SERPL-CCNC: 18 U/L
ANION GAP SERPL CALC-SCNC: 13 MMOL/L
AST SERPL-CCNC: 16 U/L
BILIRUB SERPL-MCNC: 0.6 MG/DL
BUN SERPL-MCNC: 24 MG/DL
CALCIUM SERPL-MCNC: 9.3 MG/DL
CHLORIDE SERPL-SCNC: 105 MMOL/L
CHOLEST SERPL-MCNC: 130 MG/DL
CO2 SERPL-SCNC: 22 MMOL/L
CREAT SERPL-MCNC: 0.81 MG/DL
EGFR: 105 ML/MIN/1.73M2
ESTIMATED AVERAGE GLUCOSE: 120 MG/DL
GLUCOSE SERPL-MCNC: 105 MG/DL
HBA1C MFR BLD HPLC: 5.8 %
HDLC SERPL-MCNC: 35 MG/DL
LDLC SERPL CALC-MCNC: 79 MG/DL
NONHDLC SERPL-MCNC: 95 MG/DL
POTASSIUM SERPL-SCNC: 4.1 MMOL/L
PROT SERPL-MCNC: 7.1 G/DL
SODIUM SERPL-SCNC: 140 MMOL/L
TRIGL SERPL-MCNC: 78 MG/DL

## 2024-05-15 ENCOUNTER — NON-APPOINTMENT (OUTPATIENT)
Age: 54
End: 2024-05-15

## 2024-05-16 ENCOUNTER — TRANSCRIPTION ENCOUNTER (OUTPATIENT)
Age: 54
End: 2024-05-16

## 2024-05-16 ENCOUNTER — OUTPATIENT (OUTPATIENT)
Dept: OUTPATIENT SERVICES | Facility: HOSPITAL | Age: 54
LOS: 1 days | End: 2024-05-16
Payer: COMMERCIAL

## 2024-05-16 VITALS — WEIGHT: 250 LBS | HEIGHT: 71 IN

## 2024-05-16 VITALS
SYSTOLIC BLOOD PRESSURE: 124 MMHG | HEART RATE: 68 BPM | OXYGEN SATURATION: 96 % | DIASTOLIC BLOOD PRESSURE: 63 MMHG | RESPIRATION RATE: 16 BRPM | TEMPERATURE: 98 F

## 2024-05-16 DIAGNOSIS — K08.409 PARTIAL LOSS OF TEETH, UNSPECIFIED CAUSE, UNSPECIFIED CLASS: Chronic | ICD-10-CM

## 2024-05-16 DIAGNOSIS — Z98.890 OTHER SPECIFIED POSTPROCEDURAL STATES: Chronic | ICD-10-CM

## 2024-05-16 DIAGNOSIS — R93.1 ABNORMAL FINDINGS ON DIAGNOSTIC IMAGING OF HEART AND CORONARY CIRCULATION: ICD-10-CM

## 2024-05-16 DIAGNOSIS — Z90.49 ACQUIRED ABSENCE OF OTHER SPECIFIED PARTS OF DIGESTIVE TRACT: Chronic | ICD-10-CM

## 2024-05-16 LAB
ANION GAP SERPL CALC-SCNC: 14 MMOL/L — SIGNIFICANT CHANGE UP (ref 5–17)
BUN SERPL-MCNC: 22 MG/DL — SIGNIFICANT CHANGE UP (ref 7–23)
CALCIUM SERPL-MCNC: 10.5 MG/DL — SIGNIFICANT CHANGE UP (ref 8.4–10.5)
CHLORIDE SERPL-SCNC: 100 MMOL/L — SIGNIFICANT CHANGE UP (ref 96–108)
CO2 SERPL-SCNC: 25 MMOL/L — SIGNIFICANT CHANGE UP (ref 22–31)
CREAT SERPL-MCNC: 0.82 MG/DL — SIGNIFICANT CHANGE UP (ref 0.5–1.3)
EGFR: 104 ML/MIN/1.73M2 — SIGNIFICANT CHANGE UP
GLUCOSE BLDC GLUCOMTR-MCNC: 144 MG/DL — HIGH (ref 70–99)
GLUCOSE BLDC GLUCOMTR-MCNC: 97 MG/DL — SIGNIFICANT CHANGE UP (ref 70–99)
GLUCOSE SERPL-MCNC: 106 MG/DL — HIGH (ref 70–99)
HCT VFR BLD CALC: 47.3 % — SIGNIFICANT CHANGE UP (ref 39–50)
HGB BLD-MCNC: 15.1 G/DL — SIGNIFICANT CHANGE UP (ref 13–17)
MCHC RBC-ENTMCNC: 26.5 PG — LOW (ref 27–34)
MCHC RBC-ENTMCNC: 31.9 GM/DL — LOW (ref 32–36)
MCV RBC AUTO: 83.1 FL — SIGNIFICANT CHANGE UP (ref 80–100)
NRBC # BLD: 0 /100 WBCS — SIGNIFICANT CHANGE UP (ref 0–0)
PLATELET # BLD AUTO: 249 K/UL — SIGNIFICANT CHANGE UP (ref 150–400)
POTASSIUM SERPL-MCNC: 4.4 MMOL/L — SIGNIFICANT CHANGE UP (ref 3.5–5.3)
POTASSIUM SERPL-SCNC: 4.4 MMOL/L — SIGNIFICANT CHANGE UP (ref 3.5–5.3)
RBC # BLD: 5.69 M/UL — SIGNIFICANT CHANGE UP (ref 4.2–5.8)
RBC # FLD: 13.5 % — SIGNIFICANT CHANGE UP (ref 10.3–14.5)
SODIUM SERPL-SCNC: 139 MMOL/L — SIGNIFICANT CHANGE UP (ref 135–145)
WBC # BLD: 11.48 K/UL — HIGH (ref 3.8–10.5)
WBC # FLD AUTO: 11.48 K/UL — HIGH (ref 3.8–10.5)

## 2024-05-16 PROCEDURE — 93005 ELECTROCARDIOGRAM TRACING: CPT

## 2024-05-16 PROCEDURE — C1887: CPT

## 2024-05-16 PROCEDURE — 93010 ELECTROCARDIOGRAM REPORT: CPT | Mod: 77

## 2024-05-16 PROCEDURE — C1874: CPT

## 2024-05-16 PROCEDURE — C1725: CPT

## 2024-05-16 PROCEDURE — 93454 CORONARY ARTERY ANGIO S&I: CPT | Mod: 26,59

## 2024-05-16 PROCEDURE — 93010 ELECTROCARDIOGRAM REPORT: CPT

## 2024-05-16 PROCEDURE — 82962 GLUCOSE BLOOD TEST: CPT

## 2024-05-16 PROCEDURE — 99152 MOD SED SAME PHYS/QHP 5/>YRS: CPT

## 2024-05-16 PROCEDURE — 93454 CORONARY ARTERY ANGIO S&I: CPT | Mod: 59

## 2024-05-16 PROCEDURE — C9600: CPT | Mod: LC

## 2024-05-16 PROCEDURE — C1769: CPT

## 2024-05-16 PROCEDURE — 92928 PRQ TCAT PLMT NTRAC ST 1 LES: CPT | Mod: LC

## 2024-05-16 PROCEDURE — 85027 COMPLETE CBC AUTOMATED: CPT

## 2024-05-16 PROCEDURE — 80048 BASIC METABOLIC PNL TOTAL CA: CPT

## 2024-05-16 PROCEDURE — C1894: CPT

## 2024-05-16 RX ORDER — INSULIN LISPRO 100/ML
VIAL (ML) SUBCUTANEOUS
Refills: 0 | Status: DISCONTINUED | OUTPATIENT
Start: 2024-05-16 | End: 2024-05-30

## 2024-05-16 RX ORDER — SODIUM CHLORIDE 9 MG/ML
250 INJECTION INTRAMUSCULAR; INTRAVENOUS; SUBCUTANEOUS ONCE
Refills: 0 | Status: COMPLETED | OUTPATIENT
Start: 2024-05-16 | End: 2024-05-16

## 2024-05-16 RX ORDER — TIRZEPATIDE 15 MG/.5ML
7.5 INJECTION, SOLUTION SUBCUTANEOUS
Refills: 0 | DISCHARGE

## 2024-05-16 RX ORDER — GLUCAGON INJECTION, SOLUTION 0.5 MG/.1ML
1 INJECTION, SOLUTION SUBCUTANEOUS ONCE
Refills: 0 | Status: DISCONTINUED | OUTPATIENT
Start: 2024-05-16 | End: 2024-05-30

## 2024-05-16 RX ORDER — ALLOPURINOL 300 MG
300 TABLET ORAL DAILY
Refills: 0 | Status: DISCONTINUED | OUTPATIENT
Start: 2024-05-16 | End: 2024-05-30

## 2024-05-16 RX ORDER — ASPIRIN/CALCIUM CARB/MAGNESIUM 324 MG
81 TABLET ORAL DAILY
Refills: 0 | Status: DISCONTINUED | OUTPATIENT
Start: 2024-05-16 | End: 2024-05-30

## 2024-05-16 RX ORDER — DEXTROSE 50 % IN WATER 50 %
25 SYRINGE (ML) INTRAVENOUS ONCE
Refills: 0 | Status: DISCONTINUED | OUTPATIENT
Start: 2024-05-16 | End: 2024-05-30

## 2024-05-16 RX ORDER — OLMESARTAN MEDOXOMIL-HYDROCHLOROTHIAZIDE 25; 40 MG/1; MG/1
1 TABLET, FILM COATED ORAL
Refills: 0 | DISCHARGE

## 2024-05-16 RX ORDER — SODIUM CHLORIDE 9 MG/ML
1000 INJECTION, SOLUTION INTRAVENOUS
Refills: 0 | Status: DISCONTINUED | OUTPATIENT
Start: 2024-05-16 | End: 2024-05-30

## 2024-05-16 RX ORDER — DEXTROSE 10 % IN WATER 10 %
125 INTRAVENOUS SOLUTION INTRAVENOUS ONCE
Refills: 0 | Status: DISCONTINUED | OUTPATIENT
Start: 2024-05-16 | End: 2024-05-30

## 2024-05-16 RX ORDER — INSULIN DEGLUDEC 100 U/ML
8 INJECTION, SOLUTION SUBCUTANEOUS
Qty: 0 | Refills: 0 | DISCHARGE

## 2024-05-16 RX ORDER — DEXTROSE 50 % IN WATER 50 %
15 SYRINGE (ML) INTRAVENOUS ONCE
Refills: 0 | Status: DISCONTINUED | OUTPATIENT
Start: 2024-05-16 | End: 2024-05-30

## 2024-05-16 RX ORDER — INSULIN LISPRO 100/ML
5 VIAL (ML) SUBCUTANEOUS
Qty: 0 | Refills: 0 | DISCHARGE

## 2024-05-16 RX ORDER — ATORVASTATIN CALCIUM 80 MG/1
10 TABLET, FILM COATED ORAL AT BEDTIME
Refills: 0 | Status: DISCONTINUED | OUTPATIENT
Start: 2024-05-16 | End: 2024-05-30

## 2024-05-16 RX ORDER — ASPIRIN/CALCIUM CARB/MAGNESIUM 324 MG
1 TABLET ORAL
Qty: 0 | Refills: 0 | DISCHARGE

## 2024-05-16 RX ORDER — ALLOPURINOL 300 MG
1 TABLET ORAL
Refills: 0 | DISCHARGE

## 2024-05-16 RX ORDER — SODIUM CHLORIDE 9 MG/ML
1000 INJECTION INTRAMUSCULAR; INTRAVENOUS; SUBCUTANEOUS
Refills: 0 | Status: DISCONTINUED | OUTPATIENT
Start: 2024-05-16 | End: 2024-05-30

## 2024-05-16 RX ORDER — METOPROLOL TARTRATE 50 MG
50 TABLET ORAL DAILY
Refills: 0 | Status: DISCONTINUED | OUTPATIENT
Start: 2024-05-16 | End: 2024-05-30

## 2024-05-16 RX ORDER — DEXTROSE 50 % IN WATER 50 %
12.5 SYRINGE (ML) INTRAVENOUS ONCE
Refills: 0 | Status: DISCONTINUED | OUTPATIENT
Start: 2024-05-16 | End: 2024-05-30

## 2024-05-16 RX ORDER — METOPROLOL TARTRATE 50 MG
1 TABLET ORAL
Qty: 0 | Refills: 0 | DISCHARGE

## 2024-05-16 RX ORDER — ATORVASTATIN CALCIUM 80 MG/1
1 TABLET, FILM COATED ORAL
Qty: 0 | Refills: 0 | DISCHARGE

## 2024-05-16 RX ORDER — METFORMIN HYDROCHLORIDE 850 MG/1
1 TABLET ORAL
Qty: 0 | Refills: 0 | DISCHARGE

## 2024-05-16 RX ORDER — TRIAMTERENE/HYDROCHLOROTHIAZID 75 MG-50MG
1 TABLET ORAL
Qty: 0 | Refills: 0 | DISCHARGE

## 2024-05-16 RX ORDER — CLOPIDOGREL BISULFATE 75 MG/1
1 TABLET, FILM COATED ORAL
Qty: 90 | Refills: 3
Start: 2024-05-16 | End: 2025-05-10

## 2024-05-16 RX ADMIN — SODIUM CHLORIDE 75 MILLILITER(S): 9 INJECTION INTRAMUSCULAR; INTRAVENOUS; SUBCUTANEOUS at 10:07

## 2024-05-16 RX ADMIN — SODIUM CHLORIDE 750 MILLILITER(S): 9 INJECTION INTRAMUSCULAR; INTRAVENOUS; SUBCUTANEOUS at 10:08

## 2024-05-16 RX ADMIN — SODIUM CHLORIDE 100 MILLILITER(S): 9 INJECTION INTRAMUSCULAR; INTRAVENOUS; SUBCUTANEOUS at 15:56

## 2024-05-16 NOTE — ASU DISCHARGE PLAN (ADULT/PEDIATRIC) - NS MD DC FALL RISK RISK
For information on Fall & Injury Prevention, visit: [email or web address] OR  [email or web address] OR  https://www.cdc.gov/steadi/patient.html

## 2024-05-16 NOTE — CHART NOTE - NSCHARTNOTEFT_GEN_A_CORE
Cardiac Rehab          *Education on benefits of Cardiac Rehab provided to patient: Yes         *Referral and Prescription Given for Cardiac Rehab : Yes         *Pt given list of locations & instructed to contact their insurance company to review list of participating providers: Yes         *Pt instructed to bring Cardiac Rehab prescription with them to Cardiology Follow up appointment for assistance with enrollment: Yes         *Pt discharged with copies detail cardiovascular history, medications, testing/treatments: Yes

## 2024-05-16 NOTE — ASU DISCHARGE PLAN (ADULT/PEDIATRIC) - CARE PROVIDER_API CALL
Aixa Raiini  Cardiovascular Disease  [address] [place] [address]  Phone: [phone]  Fax: [phone]  Established Patient  Follow Up Time: 2 weeks

## 2024-05-16 NOTE — ASU DISCHARGE PLAN (ADULT/PEDIATRIC) - ASU DC SPECIAL INSTRUCTIONSFT
Please see pre printed discharge instructions sheet. Please see pre printed discharge instructions sheet.    Follow up with Your cardiologist for cardiac rehab referral Please see pre printed discharge instructions sheet.    Follow up with Your cardiologist for cardiac rehab referral.    We have provided you with a prescription for cardiac rehab which is medically supervised exercise program for your heart and has been shown to improve the quantity and quality of life of people with heart disease like yours. You should attend cardiac rehab 3 times per week for 12 weeks. We have provided you with a list of nearby facilities. Please call your insurance carrier to determine which of these facilities are covered under your plan. Please bring this prescription with you to your follow up appointment with your cardiologist who can then further assist you to enroll into a cardiac rehab program.

## 2024-05-16 NOTE — ASU PATIENT PROFILE, ADULT - FALL HARM RISK - HARM RISK INTERVENTIONS

## 2024-05-16 NOTE — H&P CARDIOLOGY - HISTORY OF PRESENT ILLNESS
53 year old male h/o HTN, T2DM (A1c 5.3 on Mounjaro 80 lb wt loss this past year), HLD, hypothyroidism, depression/anxiety who c/o HOWARD progressively increasing over past year. 4/17/24 CT CORS LM = 32, LAD = 613, LCX = 811, RCA = 1532. Pt presents today for left heart cath.       Refer: Dr Rashad Rai

## 2024-09-10 ENCOUNTER — APPOINTMENT (OUTPATIENT)
Dept: ENDOCRINOLOGY | Facility: CLINIC | Age: 54
End: 2024-09-10
Payer: COMMERCIAL

## 2024-09-10 VITALS
OXYGEN SATURATION: 98 % | HEIGHT: 68 IN | SYSTOLIC BLOOD PRESSURE: 119 MMHG | DIASTOLIC BLOOD PRESSURE: 75 MMHG | BODY MASS INDEX: 40.32 KG/M2 | HEART RATE: 75 BPM | WEIGHT: 266 LBS

## 2024-09-10 DIAGNOSIS — E11.65 TYPE 2 DIABETES MELLITUS WITH HYPERGLYCEMIA: ICD-10-CM

## 2024-09-10 DIAGNOSIS — E78.00 PURE HYPERCHOLESTEROLEMIA, UNSPECIFIED: ICD-10-CM

## 2024-09-10 LAB — HBA1C MFR BLD HPLC: 5.9

## 2024-09-10 PROCEDURE — 83036 HEMOGLOBIN GLYCOSYLATED A1C: CPT | Mod: QW

## 2024-09-10 PROCEDURE — 99214 OFFICE O/P EST MOD 30 MIN: CPT | Mod: 25

## 2024-09-10 NOTE — HISTORY OF PRESENT ILLNESS
[FreeTextEntry1] : Mr. ISIS COLE is a 54 year old male with past medical history of Diabetes Mellitus Type 2, HLD, HTN, Obesity, hypogonadism, anxiety who presents for management of his diabetes.  Patient feels well and denies any complaints. Patient admits that he has not been consistent with the Mounjaro  POCT Glucose: mg/dL POCT Hga1c:5.9 %   Diabetes first diagnosed: 2019 Type: 2  Current diabetic regimen: Metformin 500 mg BID Mounjaro 10 mg Qweekly  Tresiba 10 units QHS (stopped) Lyumjev 5 units TID with meals (stopped) Ozempic (stopped due to diarrhea) Other relevant medications:  Self monitoring blood glucose : Not monitoring  Hypoglycemia:none  Diet: Breakfast: eggs, banana, bagels, english muffin Lunch:sandwich, leftoves Dinner:pizza, fast food, chinese food, pork chops Snacks: fruit, cheese, granola bars  Exercise:  Urine micro: wnl (2/2023), 32 (10/2020) lipid profile:LDL 71 (2/2023),   (9/2020) last hba1c: 5.3% (9/2023), 6.1% (2/2023), 6.6% (1/2023), 9% (10/2020) eye exam: 1/2024 diabetic foot exam/podiatry:in office (11/2023)

## 2024-09-10 NOTE — ASSESSMENT
[FreeTextEntry1] : 54 year old male with past medical history of Diabetes Mellitus Type 2, HLD, HTN, Obesity, hypogonadism, anxiety who presents for management of his diabetes  1. DM 2- controlled, uncomplicated Patient counseled on the importance of diabetic control and complications. Patient's diet and the necessary changes discussed. Reviewed over freestyle daniel and use. Reviewed over glycemic goals.  Stop metformin 500 mg BID Cont Mounjaro 10 mg qweekly Check fsg BID Cont diabetic diet Cont exercise labs ordered Opthalmology Visit up to date Foot Exam up to date  2. HLD-  cont atorvastatin

## 2024-12-23 ENCOUNTER — RX RENEWAL (OUTPATIENT)
Age: 54
End: 2024-12-23

## 2025-01-06 ENCOUNTER — RX RENEWAL (OUTPATIENT)
Age: 55
End: 2025-01-06

## 2025-01-06 RX ORDER — TIRZEPATIDE 10 MG/.5ML
10 INJECTION, SOLUTION SUBCUTANEOUS
Qty: 6 | Refills: 1 | Status: ACTIVE | COMMUNITY
Start: 2025-01-06 | End: 1900-01-01

## 2025-01-16 ENCOUNTER — APPOINTMENT (OUTPATIENT)
Dept: ENDOCRINOLOGY | Facility: CLINIC | Age: 55
End: 2025-01-16
Payer: COMMERCIAL

## 2025-01-16 VITALS
SYSTOLIC BLOOD PRESSURE: 143 MMHG | BODY MASS INDEX: 41.83 KG/M2 | HEART RATE: 72 BPM | WEIGHT: 276 LBS | HEIGHT: 68 IN | OXYGEN SATURATION: 98 % | DIASTOLIC BLOOD PRESSURE: 75 MMHG

## 2025-01-16 DIAGNOSIS — E78.00 PURE HYPERCHOLESTEROLEMIA, UNSPECIFIED: ICD-10-CM

## 2025-01-16 DIAGNOSIS — E11.65 TYPE 2 DIABETES MELLITUS WITH HYPERGLYCEMIA: ICD-10-CM

## 2025-01-16 PROCEDURE — 99214 OFFICE O/P EST MOD 30 MIN: CPT

## 2025-01-16 RX ORDER — METOPROLOL SUCCINATE 50 MG/1
50 TABLET, EXTENDED RELEASE ORAL
Refills: 0 | Status: ACTIVE | COMMUNITY

## 2025-01-16 RX ORDER — ATORVASTATIN CALCIUM 40 MG/1
40 TABLET, FILM COATED ORAL
Refills: 0 | Status: ACTIVE | COMMUNITY

## 2025-01-16 RX ORDER — CLOPIDOGREL BISULFATE 75 MG/1
75 TABLET, FILM COATED ORAL
Refills: 0 | Status: ACTIVE | COMMUNITY

## 2025-07-07 ENCOUNTER — RX RENEWAL (OUTPATIENT)
Age: 55
End: 2025-07-07

## 2025-07-21 ENCOUNTER — NON-APPOINTMENT (OUTPATIENT)
Age: 55
End: 2025-07-21

## 2025-07-22 ENCOUNTER — APPOINTMENT (OUTPATIENT)
Dept: ENDOCRINOLOGY | Facility: CLINIC | Age: 55
End: 2025-07-22
Payer: COMMERCIAL

## 2025-07-22 VITALS
HEART RATE: 71 BPM | WEIGHT: 280 LBS | OXYGEN SATURATION: 98 % | HEIGHT: 68 IN | BODY MASS INDEX: 42.44 KG/M2 | DIASTOLIC BLOOD PRESSURE: 60 MMHG | SYSTOLIC BLOOD PRESSURE: 110 MMHG

## 2025-07-22 DIAGNOSIS — E78.00 PURE HYPERCHOLESTEROLEMIA, UNSPECIFIED: ICD-10-CM

## 2025-07-22 DIAGNOSIS — E66.01 MORBID (SEVERE) OBESITY DUE TO EXCESS CALORIES: ICD-10-CM

## 2025-07-22 DIAGNOSIS — E11.65 TYPE 2 DIABETES MELLITUS WITH HYPERGLYCEMIA: ICD-10-CM

## 2025-07-22 PROCEDURE — G2211 COMPLEX E/M VISIT ADD ON: CPT | Mod: NC

## 2025-07-22 PROCEDURE — 99214 OFFICE O/P EST MOD 30 MIN: CPT

## (undated) DEVICE — VENODYNE/SCD SLEEVE CALF MEDIUM

## (undated) DEVICE — DRAPE 3/4 SHEET W REINFORCEMENT 56X77"

## (undated) DEVICE — DRSG ACE BANDAGE 6"

## (undated) DEVICE — SHAVER BLADE LINVATEC FULL RADIUS RESECTOR 4.2MM

## (undated) DEVICE — SUT HOOK 45 DEGREE RIGHT

## (undated) DEVICE — CANNULA ARTHREX CRYSTAL SMOOTH 5.75MMX7MM ORANGE

## (undated) DEVICE — PLV-SCD MACHINE: Type: DURABLE MEDICAL EQUIPMENT

## (undated) DEVICE — S&N ARTHROCARE WAND COBLATION WEREWOLF FLOW 90

## (undated) DEVICE — NDL SPINAL 18G X 3.5" (PINK)

## (undated) DEVICE — GLV 7.5 PROTEXIS (WHITE)

## (undated) DEVICE — TUBING DEPUY MITEK FMS INFLOW

## (undated) DEVICE — DURABLE MEDICAL EQUIPMENT: Type: DURABLE MEDICAL EQUIPMENT

## (undated) DEVICE — SOL IRR BAG NS 0.9% 3000ML

## (undated) DEVICE — TUBING DEPUY MITEK FMS OUTFLOW

## (undated) DEVICE — WARMING BLANKET LOWER ADULT

## (undated) DEVICE — GLV 8 PROTEXIS (WHITE)

## (undated) DEVICE — PLV-LINVATEC SCOPE HD 70 DEGREE: Type: DURABLE MEDICAL EQUIPMENT

## (undated) DEVICE — PLV-LINVATEC ARTHROSCOPIC TRAY: Type: DURABLE MEDICAL EQUIPMENT

## (undated) DEVICE — SUT CONMED SUTURE SAVER KIT

## (undated) DEVICE — VENODYNE/SCD SLEEVE CALF LARGE

## (undated) DEVICE — SUT MONOSOF 3-0 18" P-14

## (undated) DEVICE — DRSG KLING 2"

## (undated) DEVICE — DRAPE SPLIT SHEET 77" X 108"

## (undated) DEVICE — ARTHREX STAR SLEEVE COBAN

## (undated) DEVICE — SUT PASSER CONMED SUPERSHUTTLE

## (undated) DEVICE — SUT HOOK 45 DEGREE LEFT

## (undated) DEVICE — CANNULA LINVATEC DRY DOC 7MMX85MM W OBTURATOR

## (undated) DEVICE — PACK SHOULDER ARTHROSCOPY

## (undated) DEVICE — BUR LINVATEC OVAL 4MM

## (undated) DEVICE — NDL HYPO SAFE 22G X 1.5" (BLACK)

## (undated) DEVICE — TAPE SILK 3"

## (undated) DEVICE — Device

## (undated) DEVICE — PLV-STRYKER SYSTEM 8: Type: DURABLE MEDICAL EQUIPMENT

## (undated) DEVICE — SHAVER BLADE LINVATEC ULTRACUT 5.5MM

## (undated) DEVICE — SUT MAXON 1 27" T-12

## (undated) DEVICE — DRAPE U POLY BLUE 60"X60"

## (undated) DEVICE — SLING SHOULDER ULTRASLING LARGE